# Patient Record
Sex: MALE | URBAN - METROPOLITAN AREA
[De-identification: names, ages, dates, MRNs, and addresses within clinical notes are randomized per-mention and may not be internally consistent; named-entity substitution may affect disease eponyms.]

---

## 2019-01-10 ENCOUNTER — APPOINTMENT (RX ONLY)
Dept: URBAN - METROPOLITAN AREA CLINIC 22 | Facility: CLINIC | Age: 59
Setting detail: DERMATOLOGY
End: 2019-01-10

## 2019-01-10 DIAGNOSIS — L57.0 ACTINIC KERATOSIS: ICD-10-CM

## 2019-01-10 DIAGNOSIS — L82.1 OTHER SEBORRHEIC KERATOSIS: ICD-10-CM

## 2019-01-10 DIAGNOSIS — Z71.89 OTHER SPECIFIED COUNSELING: ICD-10-CM

## 2019-01-10 DIAGNOSIS — D22 MELANOCYTIC NEVI: ICD-10-CM

## 2019-01-10 DIAGNOSIS — D18.0 HEMANGIOMA: ICD-10-CM

## 2019-01-10 DIAGNOSIS — L81.4 OTHER MELANIN HYPERPIGMENTATION: ICD-10-CM

## 2019-01-10 PROBLEM — D48.5 NEOPLASM OF UNCERTAIN BEHAVIOR OF SKIN: Status: ACTIVE | Noted: 2019-01-10

## 2019-01-10 PROBLEM — D22.5 MELANOCYTIC NEVI OF TRUNK: Status: ACTIVE | Noted: 2019-01-10

## 2019-01-10 PROBLEM — D18.01 HEMANGIOMA OF SKIN AND SUBCUTANEOUS TISSUE: Status: ACTIVE | Noted: 2019-01-10

## 2019-01-10 PROCEDURE — 17003 DESTRUCT PREMALG LES 2-14: CPT

## 2019-01-10 PROCEDURE — ? LIQUID NITROGEN

## 2019-01-10 PROCEDURE — 99203 OFFICE O/P NEW LOW 30 MIN: CPT | Mod: 25

## 2019-01-10 PROCEDURE — 11102 TANGNTL BX SKIN SINGLE LES: CPT

## 2019-01-10 PROCEDURE — ? COUNSELING

## 2019-01-10 PROCEDURE — 17000 DESTRUCT PREMALG LESION: CPT | Mod: 59

## 2019-01-10 PROCEDURE — ? BIOPSY BY SHAVE METHOD

## 2019-01-10 ASSESSMENT — LOCATION ZONE DERM
LOCATION ZONE: NOSE
LOCATION ZONE: ARM
LOCATION ZONE: TRUNK
LOCATION ZONE: FACE

## 2019-01-10 ASSESSMENT — LOCATION SIMPLE DESCRIPTION DERM
LOCATION SIMPLE: LEFT FOREARM
LOCATION SIMPLE: RIGHT FOREARM
LOCATION SIMPLE: RIGHT CHEEK
LOCATION SIMPLE: UPPER BACK
LOCATION SIMPLE: INFERIOR FOREHEAD
LOCATION SIMPLE: NOSE
LOCATION SIMPLE: CHEST

## 2019-01-10 ASSESSMENT — LOCATION DETAILED DESCRIPTION DERM
LOCATION DETAILED: INFERIOR MID FOREHEAD
LOCATION DETAILED: LEFT VENTRAL PROXIMAL FOREARM
LOCATION DETAILED: NASAL DORSUM
LOCATION DETAILED: RIGHT MEDIAL MALAR CHEEK
LOCATION DETAILED: SUPERIOR THORACIC SPINE
LOCATION DETAILED: INFERIOR THORACIC SPINE
LOCATION DETAILED: RIGHT VENTRAL PROXIMAL FOREARM
LOCATION DETAILED: LOWER STERNUM

## 2019-01-10 NOTE — HPI: SKIN LESION
Is This A New Presentation, Or A Follow-Up?: Skin Lesion
What Type Of Note Output Would You Prefer (Optional)?: Standard Output
How Severe Is Your Skin Lesion?: mild
Has Your Skin Lesion Been Treated?: not been treated
How Severe Is Your Skin Lesion?: moderate
Has Your Skin Lesion Been Treated?: been treated
Additional History: Patient was given prednisone on 12/13/18 by pcp.
When Was It Treated?: 12/13/2018

## 2019-01-10 NOTE — PROCEDURE: BIOPSY BY SHAVE METHOD
Type Of Destruction Used: Curettage
Electrodesiccation And Curettage Text: The wound bed was treated with electrodesiccation and curettage after the biopsy was performed.
Biopsy Type: H and E
X Size Of Lesion In Cm: 0
Bill [id number] For Specimen Handling/Conveyance To Laboratory?: no
Billing Type: Third-Party Bill
Was A Bandage Applied: Yes
Silver Nitrate Text: The wound bed was treated with silver nitrate after the biopsy was performed.
Hemostasis: Drysol
Notification Instructions: Patient will be notified of biopsy results. However, patient instructed to call the office if not contacted within 2 weeks.
Post-Care Instructions: I reviewed with the patient in detail post-care instructions. Patient is to keep the biopsy site dry overnight. Gentle cleansing daily.  Apply petroleum ointment daily until healed. Patient may apply hydrogen peroxide soaks to remove any crusting.
Anesthesia Volume In Cc: 2
Dressing: pressure dressing with telfa
Consent: Written consent was obtained and risks were reviewed including but not limited to scarring, infection, bleeding, scabbing, incomplete removal, nerve damage and allergy to anesthesia.
Biopsy Method: Personna blade
Lab: [id number]
Detail Level: Detailed
Curettage Text: The wound bed was treated with curettage after the biopsy was performed.
Wound Care: Petrolatum
Electrodesiccation Text: The wound bed was treated with electrodesiccation after the biopsy was performed.
Depth Of Biopsy: dermis
Lab Facility: 
Size Of Lesion In Cm: 2.2
Cryotherapy Text: The wound bed was treated with cryotherapy after the biopsy was performed.
Anesthesia Type: 1% lidocaine with 1:100,000 epinephrine and a 1:10 solution of 8.4% sodium bicarbonate

## 2019-01-23 ENCOUNTER — APPOINTMENT (RX ONLY)
Dept: URBAN - METROPOLITAN AREA CLINIC 22 | Facility: CLINIC | Age: 59
Setting detail: DERMATOLOGY
End: 2019-01-23

## 2019-01-23 PROBLEM — C44.612 BASAL CELL CARCINOMA OF SKIN OF RIGHT UPPER LIMB, INCLUDING SHOULDER: Status: ACTIVE | Noted: 2019-01-23

## 2019-01-23 PROCEDURE — ? EXCISION

## 2019-01-23 PROCEDURE — 11603 EXC TR-EXT MAL+MARG 2.1-3 CM: CPT

## 2019-01-23 PROCEDURE — 12032 INTMD RPR S/A/T/EXT 2.6-7.5: CPT

## 2019-01-23 NOTE — PROCEDURE: EXCISION
Intermediate / Complex Repair - Final Wound Length In Cm: 6.2
Complex Repair And V-Y Plasty Text: The defect edges were debeveled with a #15 scalpel blade.  The primary defect was closed partially with a complex linear closure.  Given the location of the remaining defect, shape of the defect and the proximity to free margins a V-Y plasty was deemed most appropriate for complete closure of the defect.  Using a sterile surgical marker, an appropriate advancement flap was drawn incorporating the defect and placing the expected incisions within the relaxed skin tension lines where possible.    The area thus outlined was incised deep to adipose tissue with a #15 scalpel blade.  The skin margins were undermined to an appropriate distance in all directions utilizing iris scissors.
Purse String (Intermediate) Text: Given the location of the defect and the characteristics of the surrounding skin a purse string intermediate closure was deemed most appropriate.  Undermining was performed circumfirentially around the surgical defect.  A purse string suture was then placed and tightened.
Complex Repair And O-T Advancement Flap Text: The defect edges were debeveled with a #15 scalpel blade.  The primary defect was closed partially with a complex linear closure.  Given the location of the remaining defect, shape of the defect and the proximity to free margins an O-T advancement flap was deemed most appropriate for complete closure of the defect.  Using a sterile surgical marker, an appropriate advancement flap was drawn incorporating the defect and placing the expected incisions within the relaxed skin tension lines where possible.    The area thus outlined was incised deep to adipose tissue with a #15 scalpel blade.  The skin margins were undermined to an appropriate distance in all directions utilizing iris scissors.
Render Path Notes In Note?: no
Positioning (Leave Blank If You Do Not Want): The patient was placed in a comfortable position exposing the surgical site.
Epidermal Sutures: 3-0 Prolene
O-L Flap Text: The defect edges were debeveled with a #15 scalpel blade.  Given the location of the defect, shape of the defect and the proximity to free margins an O-L flap was deemed most appropriate.  Using a sterile surgical marker, an appropriate advancement flap was drawn incorporating the defect and placing the expected incisions within the relaxed skin tension lines where possible.    The area thus outlined was incised deep to adipose tissue with a #15 scalpel blade.  The skin margins were undermined to an appropriate distance in all directions utilizing iris scissors.
Spiral Flap Text: The defect edges were debeveled with a #15 scalpel blade.  Given the location of the defect, shape of the defect and the proximity to free margins a spiral flap was deemed most appropriate.  Using a sterile surgical marker, an appropriate rotation flap was drawn incorporating the defect and placing the expected incisions within the relaxed skin tension lines where possible. The area thus outlined was incised deep to adipose tissue with a #15 scalpel blade.  The skin margins were undermined to an appropriate distance in all directions utilizing iris scissors.
Burow's Advancement Flap Text: The defect edges were debeveled with a #15 scalpel blade.  Given the location of the defect and the proximity to free margins a Burow's advancement flap was deemed most appropriate.  Using a sterile surgical marker, the appropriate advancement flap was drawn incorporating the defect and placing the expected incisions within the relaxed skin tension lines where possible.    The area thus outlined was incised deep to adipose tissue with a #15 scalpel blade.  The skin margins were undermined to an appropriate distance in all directions utilizing iris scissors.
Hatchet Flap Text: The defect edges were debeveled with a #15 scalpel blade.  Given the location of the defect, shape of the defect and the proximity to free margins a hatchet flap was deemed most appropriate.  Using a sterile surgical marker, an appropriate hatchet flap was drawn incorporating the defect and placing the expected incisions within the relaxed skin tension lines where possible.    The area thus outlined was incised deep to adipose tissue with a #15 scalpel blade.  The skin margins were undermined to an appropriate distance in all directions utilizing iris scissors.
Interpolation Flap Text: A decision was made to reconstruct the defect utilizing an interpolation axial flap and a staged reconstruction.  A telfa template was made of the defect.  This telfa template was then used to outline the interpolation flap.  The donor area for the pedicle flap was then injected with anesthesia.  The flap was excised through the skin and subcutaneous tissue down to the layer of the underlying musculature.  The interpolation flap was carefully excised within this deep plane to maintain its blood supply.  The edges of the donor site were undermined.   The donor site was closed in a primary fashion.  The pedicle was then rotated into position and sutured.  Once the tube was sutured into place, adequate blood supply was confirmed with blanching and refill.  The pedicle was then wrapped with xeroform gauze and dressed appropriately with a telfa and gauze bandage to ensure continued blood supply and protect the attached pedicle.
Advancement Flap (Single) Text: The defect edges were debeveled with a #15 scalpel blade.  Given the location of the defect and the proximity to free margins a single advancement flap was deemed most appropriate.  Using a sterile surgical marker, an appropriate advancement flap was drawn incorporating the defect and placing the expected incisions within the relaxed skin tension lines where possible.    The area thus outlined was incised deep to adipose tissue with a #15 scalpel blade.  The skin margins were undermined to an appropriate distance in all directions utilizing iris scissors.
O-T Plasty Text: The defect edges were debeveled with a #15 scalpel blade.  Given the location of the defect, shape of the defect and the proximity to free margins an O-T plasty was deemed most appropriate.  Using a sterile surgical marker, an appropriate O-T plasty was drawn incorporating the defect and placing the expected incisions within the relaxed skin tension lines where possible.    The area thus outlined was incised deep to adipose tissue with a #15 scalpel blade.  The skin margins were undermined to an appropriate distance in all directions utilizing iris scissors.
O-Z Plasty Text: The defect edges were debeveled with a #15 scalpel blade.  Given the location of the defect, shape of the defect and the proximity to free margins an O-Z plasty (double transposition flap) was deemed most appropriate.  Using a sterile surgical marker, the appropriate transposition flaps were drawn incorporating the defect and placing the expected incisions within the relaxed skin tension lines where possible.    The area thus outlined was incised deep to adipose tissue with a #15 scalpel blade.  The skin margins were undermined to an appropriate distance in all directions utilizing iris scissors.  Hemostasis was achieved with electrocautery.  The flaps were then transposed into place, one clockwise and the other counterclockwise, and anchored with interrupted buried subcutaneous sutures.
Primary Defect Width (In Cm): 0
V-Y Flap Text: The defect edges were debeveled with a #15 scalpel blade.  Given the location of the defect, shape of the defect and the proximity to free margins a V-Y flap was deemed most appropriate.  Using a sterile surgical marker, an appropriate advancement flap was drawn incorporating the defect and placing the expected incisions within the relaxed skin tension lines where possible.    The area thus outlined was incised deep to adipose tissue with a #15 scalpel blade.  The skin margins were undermined to an appropriate distance in all directions utilizing iris scissors.
Alar Island Pedicle Flap Text: The defect edges were debeveled with a #15 scalpel blade.  Given the location of the defect, shape of the defect and the proximity to the alar rim an island pedicle advancement flap was deemed most appropriate.  Using a sterile surgical marker, an appropriate advancement flap was drawn incorporating the defect, outlining the appropriate donor tissue and placing the expected incisions within the nasal ala running parallel to the alar rim. The area thus outlined was incised with a #15 scalpel blade.  The skin margins were undermined minimally to an appropriate distance in all directions around the primary defect and laterally outward around the island pedicle utilizing iris scissors.  There was minimal undermining beneath the pedicle flap.
Bilobed Flap Text: The defect edges were debeveled with a #15 scalpel blade.  Given the location of the defect and the proximity to free margins a bilobe flap was deemed most appropriate.  Using a sterile surgical marker, an appropriate bilobe flap drawn around the defect.    The area thus outlined was incised deep to adipose tissue with a #15 scalpel blade.  The skin margins were undermined to an appropriate distance in all directions utilizing iris scissors.
Curvilinear Excision Additional Text (Leave Blank If You Do Not Want): The margin was drawn around the clinically apparent lesion.  A curvilinear shape was then drawn on the skin incorporating the lesion and margins.  Incisions were then made along these lines to the appropriate tissue plane and the lesion was extirpated.
Cartilage Graft Text: The defect edges were debeveled with a #15 scalpel blade.  Given the location of the defect, shape of the defect, the fact the defect involved a full thickness cartilage defect a cartilage graft was deemed most appropriate.  An appropriate donor site was identified, cleansed, and anesthetized. The cartilage graft was then harvested and transferred to the recipient site, oriented appropriately and then sutured into place.  The secondary defect was then repaired using a primary closure.
Complex Repair And W Plasty Text: The defect edges were debeveled with a #15 scalpel blade.  The primary defect was closed partially with a complex linear closure.  Given the location of the remaining defect, shape of the defect and the proximity to free margins a W plasty was deemed most appropriate for complete closure of the defect.  Using a sterile surgical marker, an appropriate advancement flap was drawn incorporating the defect and placing the expected incisions within the relaxed skin tension lines where possible.    The area thus outlined was incised deep to adipose tissue with a #15 scalpel blade.  The skin margins were undermined to an appropriate distance in all directions utilizing iris scissors.
Transposition Flap Text: The defect edges were debeveled with a #15 scalpel blade.  Given the location of the defect and the proximity to free margins a transposition flap was deemed most appropriate.  Using a sterile surgical marker, an appropriate transposition flap was drawn incorporating the defect.    The area thus outlined was incised deep to adipose tissue with a #15 scalpel blade.  The skin margins were undermined to an appropriate distance in all directions utilizing iris scissors.
Purse String (Simple) Text: Given the location of the defect and the characteristics of the surrounding skin a purse string simple closure was deemed most appropriate.  Undermining was performed circumferentially around the surgical defect.  A purse string suture was then placed and tightened.
Complex Repair And Melolabial Flap Text: The defect edges were debeveled with a #15 scalpel blade.  The primary defect was closed partially with a complex linear closure.  Given the location of the remaining defect, shape of the defect and the proximity to free margins a melolabial flap was deemed most appropriate for complete closure of the defect.  Using a sterile surgical marker, an appropriate advancement flap was drawn incorporating the defect and placing the expected incisions within the relaxed skin tension lines where possible.    The area thus outlined was incised deep to adipose tissue with a #15 scalpel blade.  The skin margins were undermined to an appropriate distance in all directions utilizing iris scissors.
Graft Donor Site Bandage (Optional-Leave Blank If You Don't Want In Note): Steri-strips and a pressure bandage were applied to the donor site.
Dressing: pressure dressing with telfa
A-T Advancement Flap Text: The defect edges were debeveled with a #15 scalpel blade.  Given the location of the defect, shape of the defect and the proximity to free margins an A-T advancement flap was deemed most appropriate.  Using a sterile surgical marker, an appropriate advancement flap was drawn incorporating the defect and placing the expected incisions within the relaxed skin tension lines where possible.    The area thus outlined was incised deep to adipose tissue with a #15 scalpel blade.  The skin margins were undermined to an appropriate distance in all directions utilizing iris scissors.
Complex Repair And Single Advancement Flap Text: The defect edges were debeveled with a #15 scalpel blade.  The primary defect was closed partially with a complex linear closure.  Given the location of the remaining defect, shape of the defect and the proximity to free margins a single advancement flap was deemed most appropriate for complete closure of the defect.  Using a sterile surgical marker, an appropriate advancement flap was drawn incorporating the defect and placing the expected incisions within the relaxed skin tension lines where possible.    The area thus outlined was incised deep to adipose tissue with a #15 scalpel blade.  The skin margins were undermined to an appropriate distance in all directions utilizing iris scissors.
Complex Repair And Z Plasty Text: The defect edges were debeveled with a #15 scalpel blade.  The primary defect was closed partially with a complex linear closure.  Given the location of the remaining defect, shape of the defect and the proximity to free margins a Z plasty was deemed most appropriate for complete closure of the defect.  Using a sterile surgical marker, an appropriate advancement flap was drawn incorporating the defect and placing the expected incisions within the relaxed skin tension lines where possible.    The area thus outlined was incised deep to adipose tissue with a #15 scalpel blade.  The skin margins were undermined to an appropriate distance in all directions utilizing iris scissors.
No Repair - Repaired With Adjacent Surgical Defect Text (Leave Blank If You Do Not Want): After the excision the defect was repaired concurrently with another surgical defect which was in close approximation.
Island Pedicle Flap-Requiring Vessel Identification Text: The defect edges were debeveled with a #15 scalpel blade.  Given the location of the defect, shape of the defect and the proximity to free margins an island pedicle advancement flap was deemed most appropriate.  Using a sterile surgical marker, an appropriate advancement flap was drawn, based on the axial vessel mentioned above, incorporating the defect, outlining the appropriate donor tissue and placing the expected incisions within the relaxed skin tension lines where possible.    The area thus outlined was incised deep to adipose tissue with a #15 scalpel blade.  The skin margins were undermined to an appropriate distance in all directions around the primary defect and laterally outward around the island pedicle utilizing iris scissors.  There was minimal undermining beneath the pedicle flap.
Complex Repair And Split-Thickness Skin Graft Text: The defect edges were debeveled with a #15 scalpel blade.  The primary defect was closed partially with a complex linear closure.  Given the location of the defect, shape of the defect and the proximity to free margins a split thickness skin graft was deemed most appropriate to repair the remaining defect.  The graft was trimmed to fit the size of the remaining defect.  The graft was then placed in the primary defect, oriented appropriately, and sutured into place.
Show Curettage Variables: Yes
Epidermal Autograft Text: The defect edges were debeveled with a #15 scalpel blade.  Given the location of the defect, shape of the defect and the proximity to free margins an epidermal autograft was deemed most appropriate.  Using a sterile surgical marker, the primary defect shape was transferred to the donor site. The epidermal graft was then harvested.  The skin graft was then placed in the primary defect and oriented appropriately.
Complex Repair And Dermal Autograft Text: The defect edges were debeveled with a #15 scalpel blade.  The primary defect was closed partially with a complex linear closure.  Given the location of the defect, shape of the defect and the proximity to free margins an dermal autograft was deemed most appropriate to repair the remaining defect.  The graft was trimmed to fit the size of the remaining defect.  The graft was then placed in the primary defect, oriented appropriately, and sutured into place.
Trilobed Flap Text: The defect edges were debeveled with a #15 scalpel blade.  Given the location of the defect and the proximity to free margins a trilobed flap was deemed most appropriate.  Using a sterile surgical marker, an appropriate trilobed flap drawn around the defect.    The area thus outlined was incised deep to adipose tissue with a #15 scalpel blade.  The skin margins were undermined to an appropriate distance in all directions utilizing iris scissors.
V-Y Plasty Text: The defect edges were debeveled with a #15 scalpel blade.  Given the location of the defect, shape of the defect and the proximity to free margins an V-Y advancement flap was deemed most appropriate.  Using a sterile surgical marker, an appropriate advancement flap was drawn incorporating the defect and placing the expected incisions within the relaxed skin tension lines where possible.    The area thus outlined was incised deep to adipose tissue with a #15 scalpel blade.  The skin margins were undermined to an appropriate distance in all directions utilizing iris scissors.
Repair Performed By Another Provider Text (Leave Blank If You Do Not Want): After the tissue was excised the defect was repaired by another provider.
Advancement-Rotation Flap Text: The defect edges were debeveled with a #15 scalpel blade.  Given the location of the defect, shape of the defect and the proximity to free margins an advancement-rotation flap was deemed most appropriate.  Using a sterile surgical marker, an appropriate flap was drawn incorporating the defect and placing the expected incisions within the relaxed skin tension lines where possible. The area thus outlined was incised deep to adipose tissue with a #15 scalpel blade.  The skin margins were undermined to an appropriate distance in all directions utilizing iris scissors.
Complex Repair And O-L Flap Text: The defect edges were debeveled with a #15 scalpel blade.  The primary defect was closed partially with a complex linear closure.  Given the location of the remaining defect, shape of the defect and the proximity to free margins an O-L flap was deemed most appropriate for complete closure of the defect.  Using a sterile surgical marker, an appropriate flap was drawn incorporating the defect and placing the expected incisions within the relaxed skin tension lines where possible.    The area thus outlined was incised deep to adipose tissue with a #15 scalpel blade.  The skin margins were undermined to an appropriate distance in all directions utilizing iris scissors.
Intermediate Repair Preamble Text (Leave Blank If You Do Not Want): Undermining was performed with blunt dissection.
M-Plasty Complex Repair Preamble Text (Leave Blank If You Do Not Want): Extensive wide undermining was performed.
Dermal Closure: simple interrupted
Ftsg Text: The defect edges were debeveled with a #15 scalpel blade.  Given the location of the defect, shape of the defect and the proximity to free margins a full thickness skin graft was deemed most appropriate.  Using a sterile surgical marker, the primary defect shape was transferred to the donor site. The area thus outlined was incised deep to adipose tissue with a #15 scalpel blade.  The harvested graft was then trimmed of adipose tissue until only dermis and epidermis was left.  The skin margins of the secondary defect were undermined to an appropriate distance in all directions utilizing iris scissors.  The secondary defect was closed with interrupted buried subcutaneous sutures.  The skin edges were then re-apposed with running  sutures.  The skin graft was then placed in the primary defect and oriented appropriately.
Elliptical Excision Additional Text (Leave Blank If You Do Not Want): The margin was drawn around the clinically apparent lesion.  An elliptical shape was then drawn on the skin incorporating the lesion and margins.  Incisions were then made along these lines to the appropriate tissue plane and the lesion was extirpated.
Excision Depth: adipose tissue
Fusiform Excision Additional Text (Leave Blank If You Do Not Want): The margin was drawn around the clinically apparent lesion.  A fusiform shape was then drawn on the skin incorporating the lesion and margins.  Incisions were then made along these lines to the appropriate tissue plane and the lesion was extirpated.
Rhombic Flap Text: The defect edges were debeveled with a #15 scalpel blade.  Given the location of the defect and the proximity to free margins a rhombic flap was deemed most appropriate.  Using a sterile surgical marker, an appropriate rhombic flap was drawn incorporating the defect.    The area thus outlined was incised deep to adipose tissue with a #15 scalpel blade.  The skin margins were undermined to an appropriate distance in all directions utilizing iris scissors.
Estimated Blood Loss (Cc): minimal
Wound Care: Petrolatum
Composite Graft Text: The defect edges were debeveled with a #15 scalpel blade.  Given the location of the defect, shape of the defect, the proximity to free margins and the fact the defect was full thickness a composite graft was deemed most appropriate.  The defect was outline and then transferred to the donor site.  A full thickness graft was then excised from the donor site. The graft was then placed in the primary defect, oriented appropriately and then sutured into place.  The secondary defect was then repaired using a primary closure.
Complex Repair And Double M Plasty Text: The defect edges were debeveled with a #15 scalpel blade.  The primary defect was closed partially with a complex linear closure.  Given the location of the remaining defect, shape of the defect and the proximity to free margins a double M plasty was deemed most appropriate for complete closure of the defect.  Using a sterile surgical marker, an appropriate advancement flap was drawn incorporating the defect and placing the expected incisions within the relaxed skin tension lines where possible.    The area thus outlined was incised deep to adipose tissue with a #15 scalpel blade.  The skin margins were undermined to an appropriate distance in all directions utilizing iris scissors.
Complex Repair And Bilobe Flap Text: The defect edges were debeveled with a #15 scalpel blade.  The primary defect was closed partially with a complex linear closure.  Given the location of the remaining defect, shape of the defect and the proximity to free margins a bilobe flap was deemed most appropriate for complete closure of the defect.  Using a sterile surgical marker, an appropriate advancement flap was drawn incorporating the defect and placing the expected incisions within the relaxed skin tension lines where possible.    The area thus outlined was incised deep to adipose tissue with a #15 scalpel blade.  The skin margins were undermined to an appropriate distance in all directions utilizing iris scissors.
Muscle Hinge Flap Text: The defect edges were debeveled with a #15 scalpel blade.  Given the size, depth and location of the defect and the proximity to free margins a muscle hinge flap was deemed most appropriate.  Using a sterile surgical marker, an appropriate hinge flap was drawn incorporating the defect. The area thus outlined was incised with a #15 scalpel blade.  The skin margins were undermined to an appropriate distance in all directions utilizing iris scissors.
Complex Repair And Epidermal Autograft Text: The defect edges were debeveled with a #15 scalpel blade.  The primary defect was closed partially with a complex linear closure.  Given the location of the defect, shape of the defect and the proximity to free margins an epidermal autograft was deemed most appropriate to repair the remaining defect.  The graft was trimmed to fit the size of the remaining defect.  The graft was then placed in the primary defect, oriented appropriately, and sutured into place.
Consent was obtained from the patient. The risks and benefits to therapy were discussed in detail. Specifically, the risks of infection, scarring, bleeding, prolonged wound healing, incomplete removal, allergy to anesthesia, nerve injury and recurrence were addressed. Prior to the procedure, the treatment site was clearly identified and confirmed by the patient. All components of Universal Protocol/PAUSE Rule completed.
Excision Method: Elliptical
O-T Advancement Flap Text: The defect edges were debeveled with a #15 scalpel blade.  Given the location of the defect, shape of the defect and the proximity to free margins an O-T advancement flap was deemed most appropriate.  Using a sterile surgical marker, an appropriate advancement flap was drawn incorporating the defect and placing the expected incisions within the relaxed skin tension lines where possible.    The area thus outlined was incised deep to adipose tissue with a #15 scalpel blade.  The skin margins were undermined to an appropriate distance in all directions utilizing iris scissors.
Keystone Flap Text: The defect edges were debeveled with a #15 scalpel blade.  Given the location of the defect, shape of the defect a keystone flap was deemed most appropriate.  Using a sterile surgical marker, an appropriate keystone flap was drawn incorporating the defect, outlining the appropriate donor tissue and placing the expected incisions within the relaxed skin tension lines where possible. The area thus outlined was incised deep to adipose tissue with a #15 scalpel blade.  The skin margins were undermined to an appropriate distance in all directions around the primary defect and laterally outward around the flap utilizing iris scissors.
Paramedian Forehead Flap Text: A decision was made to reconstruct the defect utilizing an interpolation axial flap and a staged reconstruction.  A telfa template was made of the defect.  This telfa template was then used to outline the paramedian forehead pedicle flap.  The donor area for the pedicle flap was then injected with anesthesia.  The flap was excised through the skin and subcutaneous tissue down to the layer of the underlying musculature.  The pedicle flap was carefully excised within this deep plane to maintain its blood supply.  The edges of the donor site were undermined.   The donor site was closed in a primary fashion.  The pedicle was then rotated into position and sutured.  Once the tube was sutured into place, adequate blood supply was confirmed with blanching and refill.  The pedicle was then wrapped with xeroform gauze and dressed appropriately with a telfa and gauze bandage to ensure continued blood supply and protect the attached pedicle.
Suture Removal: 14 days
Z Plasty Text: The lesion was extirpated to the level of the fat with a #15 scalpel blade.  Given the location of the defect, shape of the defect and the proximity to free margins a Z-plasty was deemed most appropriate for repair.  Using a sterile surgical marker, the appropriate transposition arms of the Z-plasty were drawn incorporating the defect and placing the expected incisions within the relaxed skin tension lines where possible.    The area thus outlined was incised deep to adipose tissue with a #15 scalpel blade.  The skin margins were undermined to an appropriate distance in all directions utilizing iris scissors.  The opposing transposition arms were then transposed into place in opposite direction and anchored with interrupted buried subcutaneous sutures.
Banner Transposition Flap Text: The defect edges were debeveled with a #15 scalpel blade.  Given the location of the defect and the proximity to free margins a Banner transposition flap was deemed most appropriate.  Using a sterile surgical marker, an appropriate flap drawn around the defect. The area thus outlined was incised deep to adipose tissue with a #15 scalpel blade.  The skin margins were undermined to an appropriate distance in all directions utilizing iris scissors.
Cheek-To-Nose Interpolation Flap Text: A decision was made to reconstruct the defect utilizing an interpolation axial flap and a staged reconstruction.  A telfa template was made of the defect.  This telfa template was then used to outline the Cheek-To-Nose Interpolation flap.  The donor area for the pedicle flap was then injected with anesthesia.  The flap was excised through the skin and subcutaneous tissue down to the layer of the underlying musculature.  The interpolation flap was carefully excised within this deep plane to maintain its blood supply.  The edges of the donor site were undermined.   The donor site was closed in a primary fashion.  The pedicle was then rotated into position and sutured.  Once the tube was sutured into place, adequate blood supply was confirmed with blanching and refill.  The pedicle was then wrapped with xeroform gauze and dressed appropriately with a telfa and gauze bandage to ensure continued blood supply and protect the attached pedicle.
Mucosal Advancement Flap Text: Given the location of the defect, shape of the defect and the proximity to free margins a mucosal advancement flap was deemed most appropriate. Incisions were made with a 15 blade scalpel in the appropriate fashion along the cutaneous vermilion border and the mucosal lip. The remaining actinically damaged mucosal tissue was excised.  The mucosal advancement flap was then elevated to the gingival sulcus with care taken to preserve the neurovascular structures and advanced into the primary defect. Care was taken to ensure that precise realignment of the vermilion border was achieved.
Complex Repair And Modified Advancement Flap Text: The defect edges were debeveled with a #15 scalpel blade.  The primary defect was closed partially with a complex linear closure.  Given the location of the remaining defect, shape of the defect and the proximity to free margins a modified advancement flap was deemed most appropriate for complete closure of the defect.  Using a sterile surgical marker, an appropriate advancement flap was drawn incorporating the defect and placing the expected incisions within the relaxed skin tension lines where possible.    The area thus outlined was incised deep to adipose tissue with a #15 scalpel blade.  The skin margins were undermined to an appropriate distance in all directions utilizing iris scissors.
Complex Repair And Rotation Flap Text: The defect edges were debeveled with a #15 scalpel blade.  The primary defect was closed partially with a complex linear closure.  Given the location of the remaining defect, shape of the defect and the proximity to free margins a rotation flap was deemed most appropriate for complete closure of the defect.  Using a sterile surgical marker, an appropriate advancement flap was drawn incorporating the defect and placing the expected incisions within the relaxed skin tension lines where possible.    The area thus outlined was incised deep to adipose tissue with a #15 scalpel blade.  The skin margins were undermined to an appropriate distance in all directions utilizing iris scissors.
Helical Rim Advancement Flap Text: The defect edges were debeveled with a #15 blade scalpel.  Given the location of the defect and the proximity to free margins (helical rim) a double helical rim advancement flap was deemed most appropriate.  Using a sterile surgical marker, the appropriate advancement flaps were drawn incorporating the defect and placing the expected incisions between the helical rim and antihelix where possible.  The area thus outlined was incised through and through with a #15 scalpel blade.  With a skin hook and iris scissors, the flaps were gently and sharply undermined and freed up.
Complex Repair And Rhombic Flap Text: The defect edges were debeveled with a #15 scalpel blade.  The primary defect was closed partially with a complex linear closure.  Given the location of the remaining defect, shape of the defect and the proximity to free margins a rhombic flap was deemed most appropriate for complete closure of the defect.  Using a sterile surgical marker, an appropriate advancement flap was drawn incorporating the defect and placing the expected incisions within the relaxed skin tension lines where possible.    The area thus outlined was incised deep to adipose tissue with a #15 scalpel blade.  The skin margins were undermined to an appropriate distance in all directions utilizing iris scissors.
Star Wedge Flap Text: The defect edges were debeveled with a #15 scalpel blade.  Given the location of the defect, shape of the defect and the proximity to free margins a star wedge flap was deemed most appropriate.  Using a sterile surgical marker, an appropriate rotation flap was drawn incorporating the defect and placing the expected incisions within the relaxed skin tension lines where possible. The area thus outlined was incised deep to adipose tissue with a #15 scalpel blade.  The skin margins were undermined to an appropriate distance in all directions utilizing iris scissors.
H Plasty Text: Given the location of the defect, shape of the defect and the proximity to free margins a H-plasty was deemed most appropriate for repair.  Using a sterile surgical marker, the appropriate advancement arms of the H-plasty were drawn incorporating the defect and placing the expected incisions within the relaxed skin tension lines where possible. The area thus outlined was incised deep to adipose tissue with a #15 scalpel blade. The skin margins were undermined to an appropriate distance in all directions utilizing iris scissors.  The opposing advancement arms were then advanced into place in opposite direction and anchored with interrupted buried subcutaneous sutures.
Posterior Auricular Interpolation Flap Text: A decision was made to reconstruct the defect utilizing an interpolation axial flap and a staged reconstruction.  A telfa template was made of the defect.  This telfa template was then used to outline the posterior auricular interpolation flap.  The donor area for the pedicle flap was then injected with anesthesia.  The flap was excised through the skin and subcutaneous tissue down to the layer of the underlying musculature.  The pedicle flap was carefully excised within this deep plane to maintain its blood supply.  The edges of the donor site were undermined.   The donor site was closed in a primary fashion.  The pedicle was then rotated into position and sutured.  Once the tube was sutured into place, adequate blood supply was confirmed with blanching and refill.  The pedicle was then wrapped with xeroform gauze and dressed appropriately with a telfa and gauze bandage to ensure continued blood supply and protect the attached pedicle.
W Plasty Text: The lesion was extirpated to the level of the fat with a #15 scalpel blade.  Given the location of the defect, shape of the defect and the proximity to free margins a W-plasty was deemed most appropriate for repair.  Using a sterile surgical marker, the appropriate transposition arms of the W-plasty were drawn incorporating the defect and placing the expected incisions within the relaxed skin tension lines where possible.    The area thus outlined was incised deep to adipose tissue with a #15 scalpel blade.  The skin margins were undermined to an appropriate distance in all directions utilizing iris scissors.  The opposing transposition arms were then transposed into place in opposite direction and anchored with interrupted buried subcutaneous sutures.
Melolabial Transposition Flap Text: The defect edges were debeveled with a #15 scalpel blade.  Given the location of the defect and the proximity to free margins a melolabial flap was deemed most appropriate.  Using a sterile surgical marker, an appropriate melolabial transposition flap was drawn incorporating the defect.    The area thus outlined was incised deep to adipose tissue with a #15 scalpel blade.  The skin margins were undermined to an appropriate distance in all directions utilizing iris scissors.
Repair Type: Intermediate
Partial Purse String (Intermediate) Text: Given the location of the defect and the characteristics of the surrounding skin an intermediate purse string closure was deemed most appropriate.  Undermining was performed circumferentially around the surgical defect.  A purse string suture was then placed and tightened. Wound tension of the circular defect prevented complete closure of the wound.
Hemostasis: Electrocautery
Tissue Cultured Epidermal Autograft Text: The defect edges were debeveled with a #15 scalpel blade.  Given the location of the defect, shape of the defect and the proximity to free margins a tissue cultured epidermal autograft was deemed most appropriate.  The graft was then trimmed to fit the size of the defect.  The graft was then placed in the primary defect and oriented appropriately.
Complex Repair And M Plasty Text: The defect edges were debeveled with a #15 scalpel blade.  The primary defect was closed partially with a complex linear closure.  Given the location of the remaining defect, shape of the defect and the proximity to free margins an M plasty was deemed most appropriate for complete closure of the defect.  Using a sterile surgical marker, an appropriate advancement flap was drawn incorporating the defect and placing the expected incisions within the relaxed skin tension lines where possible.    The area thus outlined was incised deep to adipose tissue with a #15 scalpel blade.  The skin margins were undermined to an appropriate distance in all directions utilizing iris scissors.
Anesthesia Volume In Cc: 7
Scalpel Size: 15 blade
Double Island Pedicle Flap Text: The defect edges were debeveled with a #15 scalpel blade.  Given the location of the defect, shape of the defect and the proximity to free margins a double island pedicle advancement flap was deemed most appropriate.  Using a sterile surgical marker, an appropriate advancement flap was drawn incorporating the defect, outlining the appropriate donor tissue and placing the expected incisions within the relaxed skin tension lines where possible.    The area thus outlined was incised deep to adipose tissue with a #15 scalpel blade.  The skin margins were undermined to an appropriate distance in all directions around the primary defect and laterally outward around the island pedicle utilizing iris scissors.  There was minimal undermining beneath the pedicle flap.
Post-Care Instructions: I reviewed with the patient in detail post-care instructions. Patient is not to engage in any heavy lifting, exercise, or swimming for the next 14 days. Should the patient develop any fevers, chills, expanding redness, bleeding, purulent drainage, severe pain patient will contact the office immediately.
Deep Sutures: 3-0 Maxon
Home Suture Removal Text: Patient was provided a home suture removal kit and will remove their sutures at home.  If they have any questions or difficulties they will call the office.
Slit Excision Additional Text (Leave Blank If You Do Not Want): A linear line was drawn on the skin overlying the lesion. An incision was made slowly until the lesion was visualized.  Once visualized, the lesion was removed with blunt dissection.
Anesthesia Type: 1% lidocaine with epinephrine
Complex Repair And Dorsal Nasal Flap Text: The defect edges were debeveled with a #15 scalpel blade.  The primary defect was closed partially with a complex linear closure.  Given the location of the remaining defect, shape of the defect and the proximity to free margins a dorsal nasal flap was deemed most appropriate for complete closure of the defect.  Using a sterile surgical marker, an appropriate flap was drawn incorporating the defect and placing the expected incisions within the relaxed skin tension lines where possible.    The area thus outlined was incised deep to adipose tissue with a #15 scalpel blade.  The skin margins were undermined to an appropriate distance in all directions utilizing iris scissors.
Rotation Flap Text: The defect edges were debeveled with a #15 scalpel blade.  Given the location of the defect, shape of the defect and the proximity to free margins a rotation flap was deemed most appropriate.  Using a sterile surgical marker, an appropriate rotation flap was drawn incorporating the defect and placing the expected incisions within the relaxed skin tension lines where possible.    The area thus outlined was incised deep to adipose tissue with a #15 scalpel blade.  The skin margins were undermined to an appropriate distance in all directions utilizing iris scissors.
Ear Star Wedge Flap Text: The defect edges were debeveled with a #15 blade scalpel.  Given the location of the defect and the proximity to free margins (helical rim) an ear star wedge flap was deemed most appropriate.  Using a sterile surgical marker, the appropriate flap was drawn incorporating the defect and placing the expected incisions between the helical rim and antihelix where possible.  The area thus outlined was incised through and through with a #15 scalpel blade.
Saucerization Excision Additional Text (Leave Blank If You Do Not Want): The margin was drawn around the clinically apparent lesion.  Incisions were then made along these lines, in a tangential fashion, to the appropriate tissue plane and the lesion was extirpated.
Crescentic Advancement Flap Text: The defect edges were debeveled with a #15 scalpel blade.  Given the location of the defect and the proximity to free margins a crescentic advancement flap was deemed most appropriate.  Using a sterile surgical marker, the appropriate advancement flap was drawn incorporating the defect and placing the expected incisions within the relaxed skin tension lines where possible.    The area thus outlined was incised deep to adipose tissue with a #15 scalpel blade.  The skin margins were undermined to an appropriate distance in all directions utilizing iris scissors.
Perilesional Excision Additional Text (Leave Blank If You Do Not Want): The margin was drawn around the clinically apparent lesion. Incisions were then made along these lines to the appropriate tissue plane and the lesion was extirpated.
Modified Advancement Flap Text: The defect edges were debeveled with a #15 scalpel blade.  Given the location of the defect, shape of the defect and the proximity to free margins a modified advancement flap was deemed most appropriate.  Using a sterile surgical marker, an appropriate advancement flap was drawn incorporating the defect and placing the expected incisions within the relaxed skin tension lines where possible.    The area thus outlined was incised deep to adipose tissue with a #15 scalpel blade.  The skin margins were undermined to an appropriate distance in all directions utilizing iris scissors.
Split-Thickness Skin Graft Text: The defect edges were debeveled with a #15 scalpel blade.  Given the location of the defect, shape of the defect and the proximity to free margins a split thickness skin graft was deemed most appropriate.  Using a sterile surgical marker, the primary defect shape was transferred to the donor site. The split thickness graft was then harvested.  The skin graft was then placed in the primary defect and oriented appropriately.
Path Notes (To The Dermatopathologist): Please check margins.
Lab: [id number]
Dorsal Nasal Flap Text: The defect edges were debeveled with a #15 scalpel blade.  Given the location of the defect and the proximity to free margins a dorsal nasal flap was deemed most appropriate.  Using a sterile surgical marker, an appropriate dorsal nasal flap was drawn around the defect.    The area thus outlined was incised deep to adipose tissue with a #15 scalpel blade.  The skin margins were undermined to an appropriate distance in all directions utilizing iris scissors.
Size Of Margin In Cm: 0.3
Island Pedicle Flap Text: The defect edges were debeveled with a #15 scalpel blade.  Given the location of the defect, shape of the defect and the proximity to free margins an island pedicle advancement flap was deemed most appropriate.  Using a sterile surgical marker, an appropriate advancement flap was drawn incorporating the defect, outlining the appropriate donor tissue and placing the expected incisions within the relaxed skin tension lines where possible.    The area thus outlined was incised deep to adipose tissue with a #15 scalpel blade.  The skin margins were undermined to an appropriate distance in all directions around the primary defect and laterally outward around the island pedicle utilizing iris scissors.  There was minimal undermining beneath the pedicle flap.
Bilobed Transposition Flap Text: The defect edges were debeveled with a #15 scalpel blade.  Given the location of the defect and the proximity to free margins a bilobed transposition flap was deemed most appropriate.  Using a sterile surgical marker, an appropriate bilobe flap drawn around the defect.    The area thus outlined was incised deep to adipose tissue with a #15 scalpel blade.  The skin margins were undermined to an appropriate distance in all directions utilizing iris scissors.
Skin Substitute Text: The defect edges were debeveled with a #15 scalpel blade.  Given the location of the defect, shape of the defect and the proximity to free margins a skin substitute graft was deemed most appropriate.  The graft material was trimmed to fit the size of the defect. The graft was then placed in the primary defect and oriented appropriately.
Complex Repair And Ftsg Text: The defect edges were debeveled with a #15 scalpel blade.  The primary defect was closed partially with a complex linear closure.  Given the location of the defect, shape of the defect and the proximity to free margins a full thickness skin graft was deemed most appropriate to repair the remaining defect.  The graft was trimmed to fit the size of the remaining defect.  The graft was then placed in the primary defect, oriented appropriately, and sutured into place.
Size Of Lesion In Cm: 2.2
Bi-Rhombic Flap Text: The defect edges were debeveled with a #15 scalpel blade.  Given the location of the defect and the proximity to free margins a bi-rhombic flap was deemed most appropriate.  Using a sterile surgical marker, an appropriate rhombic flap was drawn incorporating the defect. The area thus outlined was incised deep to adipose tissue with a #15 scalpel blade.  The skin margins were undermined to an appropriate distance in all directions utilizing iris scissors.
Cheek Interpolation Flap Text: A decision was made to reconstruct the defect utilizing an interpolation axial flap and a staged reconstruction.  A telfa template was made of the defect.  This telfa template was then used to outline the Cheek Interpolation flap.  The donor area for the pedicle flap was then injected with anesthesia.  The flap was excised through the skin and subcutaneous tissue down to the layer of the underlying musculature.  The interpolation flap was carefully excised within this deep plane to maintain its blood supply.  The edges of the donor site were undermined.   The donor site was closed in a primary fashion.  The pedicle was then rotated into position and sutured.  Once the tube was sutured into place, adequate blood supply was confirmed with blanching and refill.  The pedicle was then wrapped with xeroform gauze and dressed appropriately with a telfa and gauze bandage to ensure continued blood supply and protect the attached pedicle.
Advancement Flap (Double) Text: The defect edges were debeveled with a #15 scalpel blade.  Given the location of the defect and the proximity to free margins a double advancement flap was deemed most appropriate.  Using a sterile surgical marker, the appropriate advancement flaps were drawn incorporating the defect and placing the expected incisions within the relaxed skin tension lines where possible.    The area thus outlined was incised deep to adipose tissue with a #15 scalpel blade.  The skin margins were undermined to an appropriate distance in all directions utilizing iris scissors.
Lip Wedge Excision Repair Text: Given the location of the defect and the proximity to free margins a full thickness wedge repair was deemed most appropriate.  Using a sterile surgical marker, the appropriate repair was drawn incorporating the defect and placing the expected incisions perpendicular to the vermilion border.  The vermilion border was also meticulously outlined to ensure appropriate reapproximation during the repair.  The area thus outlined was incised through and through with a #15 scalpel blade.  The muscularis and dermis were reaproximated with deep sutures following hemostasis. Care was taken to realign the vermilion border before proceeding with the superficial closure.  Once the vermilion was realigned the superfical and mucosal closure was finished.
Complex Repair And Transposition Flap Text: The defect edges were debeveled with a #15 scalpel blade.  The primary defect was closed partially with a complex linear closure.  Given the location of the remaining defect, shape of the defect and the proximity to free margins a transposition flap was deemed most appropriate for complete closure of the defect.  Using a sterile surgical marker, an appropriate advancement flap was drawn incorporating the defect and placing the expected incisions within the relaxed skin tension lines where possible.    The area thus outlined was incised deep to adipose tissue with a #15 scalpel blade.  The skin margins were undermined to an appropriate distance in all directions utilizing iris scissors.
Bilateral Helical Rim Advancement Flap Text: The defect edges were debeveled with a #15 blade scalpel.  Given the location of the defect and the proximity to free margins (helical rim) a bilateral helical rim advancement flap was deemed most appropriate.  Using a sterile surgical marker, the appropriate advancement flaps were drawn incorporating the defect and placing the expected incisions between the helical rim and antihelix where possible.  The area thus outlined was incised through and through with a #15 scalpel blade.  With a skin hook and iris scissors, the flaps were gently and sharply undermined and freed up.
Dermal Autograft Text: The defect edges were debeveled with a #15 scalpel blade.  Given the location of the defect, shape of the defect and the proximity to free margins a dermal autograft was deemed most appropriate.  Using a sterile surgical marker, the primary defect shape was transferred to the donor site. The area thus outlined was incised deep to adipose tissue with a #15 scalpel blade.  The harvested graft was then trimmed of adipose and epidermal tissue until only dermis was left.  The skin graft was then placed in the primary defect and oriented appropriately.
Pre-Excision Curettage Text (Leave Blank If You Do Not Want): Prior to drawing the surgical margin the visible lesion was removed with electrodesiccation and curettage to clearly define the lesion size.
Melolabial Interpolation Flap Text: A decision was made to reconstruct the defect utilizing an interpolation axial flap and a staged reconstruction.  A telfa template was made of the defect.  This telfa template was then used to outline the melolabial interpolation flap.  The donor area for the pedicle flap was then injected with anesthesia.  The flap was excised through the skin and subcutaneous tissue down to the layer of the underlying musculature.  The pedicle flap was carefully excised within this deep plane to maintain its blood supply.  The edges of the donor site were undermined.   The donor site was closed in a primary fashion.  The pedicle was then rotated into position and sutured.  Once the tube was sutured into place, adequate blood supply was confirmed with blanching and refill.  The pedicle was then wrapped with xeroform gauze and dressed appropriately with a telfa and gauze bandage to ensure continued blood supply and protect the attached pedicle.
Detail Level: Detailed
S Plasty Text: Given the location and shape of the defect, and the orientation of relaxed skin tension lines, an S-plasty was deemed most appropriate for repair.  Using a sterile surgical marker, the appropriate outline of the S-plasty was drawn, incorporating the defect and placing the expected incisions within the relaxed skin tension lines where possible.  The area thus outlined was incised deep to adipose tissue with a #15 scalpel blade.  The skin margins were undermined to an appropriate distance in all directions utilizing iris scissors. The skin flaps were advanced over the defect.  The opposing margins were then approximated with interrupted buried subcutaneous sutures.
Partial Purse String (Simple) Text: Given the location of the defect and the characteristics of the surrounding skin a simple purse string closure was deemed most appropriate.  Undermining was performed circumferentially around the surgical defect.  A purse string suture was then placed and tightened. Wound tension of the circular defect prevented complete closure of the wound.
Mastoid Interpolation Flap Text: A decision was made to reconstruct the defect utilizing an interpolation axial flap and a staged reconstruction.  A telfa template was made of the defect.  This telfa template was then used to outline the mastoid interpolation flap.  The donor area for the pedicle flap was then injected with anesthesia.  The flap was excised through the skin and subcutaneous tissue down to the layer of the underlying musculature.  The pedicle flap was carefully excised within this deep plane to maintain its blood supply.  The edges of the donor site were undermined.   The donor site was closed in a primary fashion.  The pedicle was then rotated into position and sutured.  Once the tube was sutured into place, adequate blood supply was confirmed with blanching and refill.  The pedicle was then wrapped with xeroform gauze and dressed appropriately with a telfa and gauze bandage to ensure continued blood supply and protect the attached pedicle.
Lab Facility: 
Mercedes Flap Text: The defect edges were debeveled with a #15 scalpel blade.  Given the location of the defect, shape of the defect and the proximity to free margins a Mercedes flap was deemed most appropriate.  Using a sterile surgical marker, an appropriate advancement flap was drawn incorporating the defect and placing the expected incisions within the relaxed skin tension lines where possible. The area thus outlined was incised deep to adipose tissue with a #15 scalpel blade.  The skin margins were undermined to an appropriate distance in all directions utilizing iris scissors.
Xenograft Text: The defect edges were debeveled with a #15 scalpel blade.  Given the location of the defect, shape of the defect and the proximity to free margins a xenograft was deemed most appropriate.  The graft was then trimmed to fit the size of the defect.  The graft was then placed in the primary defect and oriented appropriately.
Complex Repair And Double Advancement Flap Text: The defect edges were debeveled with a #15 scalpel blade.  The primary defect was closed partially with a complex linear closure.  Given the location of the remaining defect, shape of the defect and the proximity to free margins a double advancement flap was deemed most appropriate for complete closure of the defect.  Using a sterile surgical marker, an appropriate advancement flap was drawn incorporating the defect and placing the expected incisions within the relaxed skin tension lines where possible.    The area thus outlined was incised deep to adipose tissue with a #15 scalpel blade.  The skin margins were undermined to an appropriate distance in all directions utilizing iris scissors.
Complex Repair And A-T Advancement Flap Text: The defect edges were debeveled with a #15 scalpel blade.  The primary defect was closed partially with a complex linear closure.  Given the location of the remaining defect, shape of the defect and the proximity to free margins an A-T advancement flap was deemed most appropriate for complete closure of the defect.  Using a sterile surgical marker, an appropriate advancement flap was drawn incorporating the defect and placing the expected incisions within the relaxed skin tension lines where possible.    The area thus outlined was incised deep to adipose tissue with a #15 scalpel blade.  The skin margins were undermined to an appropriate distance in all directions utilizing iris scissors.
Complex Repair And Xenograft Text: The defect edges were debeveled with a #15 scalpel blade.  The primary defect was closed partially with a complex linear closure.  Given the location of the defect, shape of the defect and the proximity to free margins a xenograft was deemed most appropriate to repair the remaining defect.  The graft was trimmed to fit the size of the remaining defect.  The graft was then placed in the primary defect, oriented appropriately, and sutured into place.
Excisional Biopsy Additional Text (Leave Blank If You Do Not Want): The margin was drawn around the clinically apparent lesion. An elliptical shape was then drawn on the skin incorporating the lesion and margins.  Incisions were then made along these lines to the appropriate tissue plane and the lesion was extirpated.
Billing Type: Third-Party Bill
Complex Repair And Tissue Cultured Epidermal Autograft Text: The defect edges were debeveled with a #15 scalpel blade.  The primary defect was closed partially with a complex linear closure.  Given the location of the defect, shape of the defect and the proximity to free margins an tissue cultured epidermal autograft was deemed most appropriate to repair the remaining defect.  The graft was trimmed to fit the size of the remaining defect.  The graft was then placed in the primary defect, oriented appropriately, and sutured into place.
Complex Repair And Skin Substitute Graft Text: The defect edges were debeveled with a #15 scalpel blade.  The primary defect was closed partially with a complex linear closure.  Given the location of the remaining defect, shape of the defect and the proximity to free margins a skin substitute graft was deemed most appropriate to repair the remaining defect.  The graft was trimmed to fit the size of the remaining defect.  The graft was then placed in the primary defect, oriented appropriately, and sutured into place.
Island Pedicle Flap With Canthal Suspension Text: The defect edges were debeveled with a #15 scalpel blade.  Given the location of the defect, shape of the defect and the proximity to free margins an island pedicle advancement flap was deemed most appropriate.  Using a sterile surgical marker, an appropriate advancement flap was drawn incorporating the defect, outlining the appropriate donor tissue and placing the expected incisions within the relaxed skin tension lines where possible. The area thus outlined was incised deep to adipose tissue with a #15 scalpel blade.  The skin margins were undermined to an appropriate distance in all directions around the primary defect and laterally outward around the island pedicle utilizing iris scissors.  There was minimal undermining beneath the pedicle flap. A suspension suture was placed in the canthal tendon to prevent tension and prevent ectropion.
Double O-Z Plasty Text: The defect edges were debeveled with a #15 scalpel blade.  Given the location of the defect, shape of the defect and the proximity to free margins a Double O-Z plasty (double transposition flap) was deemed most appropriate.  Using a sterile surgical marker, the appropriate transposition flaps were drawn incorporating the defect and placing the expected incisions within the relaxed skin tension lines where possible. The area thus outlined was incised deep to adipose tissue with a #15 scalpel blade.  The skin margins were undermined to an appropriate distance in all directions utilizing iris scissors.  Hemostasis was achieved with electrocautery.  The flaps were then transposed into place, one clockwise and the other counterclockwise, and anchored with interrupted buried subcutaneous sutures.
Rhomboid Transposition Flap Text: The defect edges were debeveled with a #15 scalpel blade.  Given the location of the defect and the proximity to free margins a rhomboid transposition flap was deemed most appropriate.  Using a sterile surgical marker, an appropriate rhomboid flap was drawn incorporating the defect.    The area thus outlined was incised deep to adipose tissue with a #15 scalpel blade.  The skin margins were undermined to an appropriate distance in all directions utilizing iris scissors.
Information: Selecting Yes will display possible errors in your note based on the variables you have selected. This validation is only offered as a suggestion for you. PLEASE NOTE THAT THE VALIDATION TEXT WILL BE REMOVED WHEN YOU FINALIZE YOUR NOTE. IF YOU WANT TO FAX A PRELIMINARY NOTE YOU WILL NEED TO TOGGLE THIS TO 'NO' IF YOU DO NOT WANT IT IN YOUR FAXED NOTE.

## 2020-02-13 ENCOUNTER — APPOINTMENT (RX ONLY)
Dept: URBAN - METROPOLITAN AREA CLINIC 22 | Facility: CLINIC | Age: 60
Setting detail: DERMATOLOGY
End: 2020-02-13

## 2020-02-13 DIAGNOSIS — G90.09 OTHER IDIOPATHIC PERIPHERAL AUTONOMIC NEUROPATHY: ICD-10-CM

## 2020-02-13 DIAGNOSIS — D22 MELANOCYTIC NEVI: ICD-10-CM

## 2020-02-13 DIAGNOSIS — Z85.828 PERSONAL HISTORY OF OTHER MALIGNANT NEOPLASM OF SKIN: ICD-10-CM

## 2020-02-13 DIAGNOSIS — L57.0 ACTINIC KERATOSIS: ICD-10-CM

## 2020-02-13 PROBLEM — D48.5 NEOPLASM OF UNCERTAIN BEHAVIOR OF SKIN: Status: ACTIVE | Noted: 2020-02-13

## 2020-02-13 PROCEDURE — 11102 TANGNTL BX SKIN SINGLE LES: CPT

## 2020-02-13 PROCEDURE — ? LIQUID NITROGEN

## 2020-02-13 PROCEDURE — 99214 OFFICE O/P EST MOD 30 MIN: CPT | Mod: 25

## 2020-02-13 PROCEDURE — ? BIOPSY BY SHAVE METHOD

## 2020-02-13 PROCEDURE — ? COUNSELING

## 2020-02-13 PROCEDURE — 17000 DESTRUCT PREMALG LESION: CPT | Mod: 59

## 2020-02-13 ASSESSMENT — LOCATION DETAILED DESCRIPTION DERM
LOCATION DETAILED: LEFT MEDIAL ZYGOMA
LOCATION DETAILED: RIGHT ANTERIOR LATERAL PROXIMAL UPPER ARM
LOCATION DETAILED: LEFT LATERAL EYEBROW
LOCATION DETAILED: RIGHT SUPERIOR UPPER BACK
LOCATION DETAILED: RIGHT ELBOW

## 2020-02-13 ASSESSMENT — LOCATION SIMPLE DESCRIPTION DERM
LOCATION SIMPLE: RIGHT UPPER ARM
LOCATION SIMPLE: LEFT ZYGOMA
LOCATION SIMPLE: RIGHT ELBOW
LOCATION SIMPLE: LEFT EYEBROW
LOCATION SIMPLE: RIGHT UPPER BACK

## 2020-02-13 ASSESSMENT — LOCATION ZONE DERM
LOCATION ZONE: ARM
LOCATION ZONE: TRUNK
LOCATION ZONE: FACE

## 2020-02-13 NOTE — PROCEDURE: COUNSELING
Detail Level: Detailed
Detail Level: Simple
Detail Level: Zone
Patient Specific Counseling (Will Not Stick From Patient To Patient): Recommend he see a neurologist \\nHe has a neurosurgeon appointment scheduled

## 2022-05-25 ENCOUNTER — APPOINTMENT (OUTPATIENT)
Dept: RADIOLOGY | Facility: MEDICAL CENTER | Age: 62
End: 2022-05-25
Attending: EMERGENCY MEDICINE
Payer: COMMERCIAL

## 2022-05-25 ENCOUNTER — HOSPITAL ENCOUNTER (EMERGENCY)
Facility: MEDICAL CENTER | Age: 62
End: 2022-05-25
Attending: EMERGENCY MEDICINE
Payer: COMMERCIAL

## 2022-05-25 VITALS
DIASTOLIC BLOOD PRESSURE: 79 MMHG | OXYGEN SATURATION: 94 % | HEART RATE: 61 BPM | RESPIRATION RATE: 17 BRPM | SYSTOLIC BLOOD PRESSURE: 132 MMHG | TEMPERATURE: 97.9 F | WEIGHT: 145 LBS

## 2022-05-25 DIAGNOSIS — S06.0X0A CONCUSSION WITHOUT LOSS OF CONSCIOUSNESS, INITIAL ENCOUNTER: ICD-10-CM

## 2022-05-25 DIAGNOSIS — M54.2 NECK PAIN: ICD-10-CM

## 2022-05-25 DIAGNOSIS — H93.12 TINNITUS OF LEFT EAR: ICD-10-CM

## 2022-05-25 DIAGNOSIS — S09.90XA CLOSED HEAD INJURY, INITIAL ENCOUNTER: ICD-10-CM

## 2022-05-25 PROCEDURE — 96374 THER/PROPH/DIAG INJ IV PUSH: CPT

## 2022-05-25 PROCEDURE — 700111 HCHG RX REV CODE 636 W/ 250 OVERRIDE (IP): Performed by: EMERGENCY MEDICINE

## 2022-05-25 PROCEDURE — 70450 CT HEAD/BRAIN W/O DYE: CPT

## 2022-05-25 PROCEDURE — 72125 CT NECK SPINE W/O DYE: CPT

## 2022-05-25 PROCEDURE — 99284 EMERGENCY DEPT VISIT MOD MDM: CPT

## 2022-05-25 PROCEDURE — 96375 TX/PRO/DX INJ NEW DRUG ADDON: CPT

## 2022-05-25 RX ORDER — KETOROLAC TROMETHAMINE 30 MG/ML
30 INJECTION, SOLUTION INTRAMUSCULAR; INTRAVENOUS ONCE
Status: COMPLETED | OUTPATIENT
Start: 2022-05-25 | End: 2022-05-25

## 2022-05-25 RX ORDER — HYDROMORPHONE HYDROCHLORIDE 1 MG/ML
0.5 INJECTION, SOLUTION INTRAMUSCULAR; INTRAVENOUS; SUBCUTANEOUS
Status: DISCONTINUED | OUTPATIENT
Start: 2022-05-25 | End: 2022-05-25 | Stop reason: HOSPADM

## 2022-05-25 RX ORDER — ONDANSETRON 2 MG/ML
4 INJECTION INTRAMUSCULAR; INTRAVENOUS ONCE
Status: COMPLETED | OUTPATIENT
Start: 2022-05-25 | End: 2022-05-25

## 2022-05-25 RX ORDER — CYCLOBENZAPRINE HCL 5 MG
5-10 TABLET ORAL 3 TIMES DAILY PRN
Qty: 20 TABLET | Refills: 0 | Status: SHIPPED | OUTPATIENT
Start: 2022-05-25 | End: 2022-05-28

## 2022-05-25 RX ADMIN — KETOROLAC TROMETHAMINE 30 MG: 30 INJECTION, SOLUTION INTRAMUSCULAR; INTRAVENOUS at 14:04

## 2022-05-25 RX ADMIN — ONDANSETRON HYDROCHLORIDE 4 MG: 2 SOLUTION INTRAMUSCULAR; INTRAVENOUS at 12:50
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
## 2022-05-25 NOTE — ED NOTES
PIV removed. Discharge instructions discussed with pt, verbalizes understanding. All belongings with pt when leaving unit, including paper prescription. Pt amb to lobby with steady gait awaiting ride.

## 2023-06-28 ENCOUNTER — PATIENT OUTREACH (OUTPATIENT)
Dept: ONCOLOGY | Facility: MEDICAL CENTER | Age: 63
End: 2023-06-28
Payer: COMMERCIAL

## 2023-07-03 ENCOUNTER — HOSPITAL ENCOUNTER (OUTPATIENT)
Dept: RADIATION ONCOLOGY | Facility: MEDICAL CENTER | Age: 63
End: 2023-07-31
Attending: RADIOLOGY
Payer: COMMERCIAL

## 2023-07-03 VITALS
SYSTOLIC BLOOD PRESSURE: 110 MMHG | BODY MASS INDEX: 23.31 KG/M2 | TEMPERATURE: 97.2 F | HEIGHT: 69 IN | RESPIRATION RATE: 16 BRPM | WEIGHT: 157.41 LBS | DIASTOLIC BLOOD PRESSURE: 72 MMHG | HEART RATE: 54 BPM | OXYGEN SATURATION: 90 %

## 2023-07-03 DIAGNOSIS — C67.8 MALIGNANT NEOPLASM OF OVERLAPPING SITES OF BLADDER (HCC): ICD-10-CM

## 2023-07-03 PROCEDURE — 99214 OFFICE O/P EST MOD 30 MIN: CPT | Performed by: RADIOLOGY

## 2023-07-03 PROCEDURE — 99205 OFFICE O/P NEW HI 60 MIN: CPT | Performed by: RADIOLOGY

## 2023-07-03 RX ORDER — OXYBUTYNIN CHLORIDE 5 MG/1
5 TABLET ORAL 3 TIMES DAILY
COMMUNITY

## 2023-07-03 RX ORDER — TIOTROPIUM BROMIDE 18 UG/1
18 CAPSULE ORAL; RESPIRATORY (INHALATION) DAILY
COMMUNITY

## 2023-07-03 RX ORDER — ALBUTEROL SULFATE 90 UG/1
2 AEROSOL, METERED RESPIRATORY (INHALATION) EVERY 6 HOURS PRN
COMMUNITY

## 2023-07-03 ASSESSMENT — PAIN SCALES - GENERAL: PAINLEVEL: NO PAIN

## 2023-07-03 NOTE — CT SIMULATION
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 on 7/3/2023  Histopathologic type: Papillary transitional cell carcinoma  Stage prefix: Initial diagnosis  WHO/ISUP grade (low/high): High Grade  Histologic grading system: 2 grade system         Treatment Planning CT Simulation      Order Questions     Question Answer Comment    Is this for a new course of treatment? Yes     Is this an Addendum? No     Implanted Device/Pacemaker No     Simulation Status Initial     Planned Start Date 7/17/2023     Treatment Site Bladder     Laterality Not Applicable     Treatment Technique IMRT     Treatment Pattern/Frequency Daily 32 fractions    Concurrent Chemotherapy Yes     Ordering Provider SOLIS POLANCO     CT Technique 3D     Slice Thickness 2mm     Scan Extent Pelvis      Abdomen     Treatment Device(s) Vac Domonique     Patient Attire Gown     Patient Position Supine     Patient Orientation Head First     Bladder Empty     Treatment Machine No preference     Treatment Image Guidance CBCT     Image Guidance Match Bone     Other Orders Weekly Physics Check      Special Tx Procedure

## 2023-07-03 NOTE — CONSULTS
RADIATION ONCOLOGY CONSULT    Patient name:  Scotty Livingston    Primary Physician:  CHASE Mason MRN: [id number]  CSN: [id number]   Referring physician:  Aundrea Alvarez P.*  : 1960, 62 y.o.     DATE OF SERVICE: 7/3/2023    IDENTIFICATION: A 62 y.o. male with   Malignant neoplasm of overlapping sites of bladder (HCC)  Staging form: Urinary Bladder, AJCC 8th Edition  - Clinical stage from 7/3/2023: Stage II (cT2, cN0, cM0) - Signed by Flavia Babb M.D. on 7/3/2023  Histopathologic type: Papillary transitional cell carcinoma  Stage prefix: Initial diagnosis  WHO/ISUP grade (low/high): High Grade  Histologic grading system: 2 grade system        He is here at the kind request of Aundrea Alvarez       HISTORY OF PRESENT ILLNESS:  Subjective     This is a 62-year-old gentleman who comes to discuss bladder preservation therapy for his recently diagnosed muscle invasive bladder cancer.  In brief, he reports that he has had an overactive bladder with frequent urination about every 1 hour over the last 2 years.  In addition, he developed intermittent hematuria about 6 months ago.      This led to a consult to urology in May of this year in the setting of a CT scan that revealed a 4.9 cm mass in the left wall of the bladder, no obvious adenopathy, concerning for neoplasm.  He subsequently had a cystoscopy and a TURBT performed that revealed a 5 cm mass in the left bladder wall, lateral to the left UO, abutting the bladder neck.  Final pathology from the TURBT specimen spanning over 5 cm was invasive urothelial carcinoma, invading the muscularis propria.  No grading is present.    Of note, he was also prescribed oxybutynin for his overactive bladder, and his urinary frequency has improved tremendously.  As a result of his pathology, he was then referred to medical oncology and saw Aundrea Alvarez.  He says they discussed surgery versus bladder preservation, and a strongly preserve his  bladder preservation.  That is why he is referred to us today.  A CT of the chest is pending for completion of work-up.  With regards to medical oncology, he does apparently have good urinary and renal function, no obvious hydronephrosis, no other obvious medical contraindications, and the likely the plan would be for low-dose gemcitabine versus a mitomycin based treatment along with concurrent radiation.    He now comes to review bladder preservation further today.  Currently feeling well, no fevers or chills, occasionally has what he describes as foul-smelling urine, though this was in the setting of his recent cystoscopy, and has not recurred since then.  No further hematuria.  Good urinary function.  Good erectile function.        PROBLEM LIST:  Patient Active Problem List   Diagnosis    Malignant neoplasm of overlapping sites of bladder (HCC)        PAST SURGICAL HISTORY:  Past Surgical History:   Procedure Laterality Date    TRANS URETHRAL RESECTION BLADDER  05/30/2023       CURRENT MEDICATIONS:  Current Outpatient Medications   Medication Sig Dispense Refill    oxybutynin (DITROPAN) 5 MG Tab Take 5 mg by mouth 3 times a day.      tiotropium (SPIRIVA) 18 MCG Cap Place 18 mcg into inhaler and inhale every day.      albuterol 108 (90 Base) MCG/ACT Aero Soln inhalation aerosol Inhale 2 Puffs every 6 hours as needed for Shortness of Breath.       No current facility-administered medications for this encounter.       ALLERGIES:    Dilaudid [hydromorphone], Duloxetine hcl, Gabapentin, and Morphine    FAMILY HISTORY:    family history is not on file.    SOCIAL HISTORY:     reports that he has been smoking cigarettes. He has never used smokeless tobacco. He reports that he does not currently use alcohol. He reports current drug use. Drugs: Inhaled, Oral, and Marijuana.  Patient is single and currently lives alone in [place]. He is a retired .    REVIEW OF SYSTEMS:    A complete review of systems taken.  "Pertinent items in HPI. All others negative.    PHYSICAL EXAM:    PERFORMANCE STATUS:      7/3/2023     9:23 AM   ECOG Performance Review   ECOG Performance Status Fully active, able to carry on all pre-disease performance without restriction         7/3/2023     9:24 AM   Karnofsky Score   Karnofsky Score 90     /72   Pulse (!) 54   Temp 36.2 °C (97.2 °F)   Resp 16   Ht 1.753 m (5' 9\")   Wt 71.4 kg (157 lb 6.5 oz)   SpO2 90%   BMI 23.25 kg/m²   Physical Exam  Constitutional:       Appearance: Normal appearance.   HENT:      Head: Normocephalic and atraumatic.   Eyes:      Extraocular Movements: Extraocular movements intact.      Conjunctiva/sclera: Conjunctivae normal.   Cardiovascular:      Rate and Rhythm: Normal rate and regular rhythm.   Pulmonary:      Effort: Pulmonary effort is normal.      Breath sounds: Normal breath sounds.   Abdominal:      General: Abdomen is flat.      Palpations: Abdomen is soft.   Musculoskeletal:         General: Normal range of motion.   Neurological:      General: No focal deficit present.      Mental Status: He is alert and oriented to person, place, and time.          LABORATORY DATA:   No results found for: WBC, RBC, HEMOGLOBIN, HEMATOCRIT, MCV, MCH, MCHC, RDW, PLATELETCT, MPV, NEUTSPOLYS, LYMPHOCYTES, MONOCYTES, EOSINOPHILS, BASOPHILS, HYPOCHROMIA, ANISOCYTOSIS   No results found for: SODIUM, POTASSIUM, CHLORIDE, CO2, GLUCOSE, BUN, CREATININE, BUNCREATRAT, GLOMRATE        RADIOLOGY DATA: Reviewed outside scans in detail personally.    IMPRESSION:    A 62 y.o. with  Malignant neoplasm of overlapping sites of bladder (HCC)  Staging form: Urinary Bladder, AJCC 8th Edition  - Clinical stage from 7/3/2023: Stage II (cT2, cN0, cM0) - Signed by Flavia Babb M.D. on 7/3/2023  Histopathologic type: Papillary transitional cell carcinoma  Stage prefix: Initial diagnosis  WHO/ISUP grade (low/high): High Grade  Histologic grading system: 2 grade " system        RECOMMENDATIONS:   I had a long discussion with him today regarding his recent diagnosis of muscle invasive bladder cancer.  I outlined to him that he is apparently stage II disease, with at least his scan showing T2N0 disease with nothing outside of the bladder.  He is certainly a good definitive systemic to make candidate, and I discussed with him the standard of care for definitive bladder management including neoadjuvant chemotherapy followed by definitive resection.  He strongly declined definitive cystectomy for concerns of his permanent ostomy, though we discussed the options for bladder reconstruction as well.  At any rate, he wishes to proceed with bladder preservation therapy, and therefore we next discussed what bladder preservation would entail with concurrent chemoradiation, the need for surveillance cystectomies going forward, and the expected acute and long-term toxicities, including bladder dysfunction, erectile dysfunction, strictures and fibrosis, as well as various chemotherapy related side effects.  We reviewed the outcomes of bladder preservation particularly in comparison to surgical outcomes, the possible need for salvage cystectomy if he has a limited pelvic recurrence, the role of chemotherapy and immunotherapy and metastatic disease, and both long-term survival as well as long-term bladder preservation outcomes.    After long discussion, he wishes to proceed with chemoradiation as planned, and CT simulation is tentatively scheduled for later this week.  We will plan to start definitive radiation along with chemotherapy with likely first treatment on July 17.  We will likely plan definitive treatment to the whole bladder to a dose of likely 60-66 Mejia in approximately 30 fractions, but depending on his simulation scan, could also proceed with 55 Gray in 20 fractions.    Thank you for the opportunity to participate in his care.  If any questions or comments, please do not  hesitate in calling.    Orders Placed This Encounter    Rad Onc Treatment Planning CT Simulation    oxybutynin (DITROPAN) 5 MG Tab    tiotropium (SPIRIVA) 18 MCG Cap    albuterol 108 (90 Base) MCG/ACT Aero Soln inhalation aerosol

## 2023-07-05 ENCOUNTER — PATIENT OUTREACH (OUTPATIENT)
Dept: ONCOLOGY | Facility: MEDICAL CENTER | Age: 63
End: 2023-07-05
Payer: COMMERCIAL

## 2023-07-05 NOTE — PROGRESS NOTES
"Call placed to pt to follow up on DST 5/10. Pt states \"oj got issues!\" He states his car is broken down and he cannot fix it at this time. [facility]. TONI provided pt with two numbers for transportation services through the [facility]. -[phone] and [facility] [phone]. Pt took numbers down and will try to call today. Pt will have SIM tomorrow 7/6 and will be present.   "

## 2023-07-06 ENCOUNTER — HOSPITAL ENCOUNTER (OUTPATIENT)
Dept: RADIATION ONCOLOGY | Facility: MEDICAL CENTER | Age: 63
End: 2023-07-06

## 2023-07-06 PROCEDURE — 77470 SPECIAL RADIATION TREATMENT: CPT | Mod: 26 | Performed by: RADIOLOGY

## 2023-07-06 PROCEDURE — 77290 THER RAD SIMULAJ FIELD CPLX: CPT | Performed by: RADIOLOGY

## 2023-07-06 PROCEDURE — 77470 SPECIAL RADIATION TREATMENT: CPT | Performed by: RADIOLOGY

## 2023-07-06 PROCEDURE — 77334 RADIATION TREATMENT AID(S): CPT | Mod: 26 | Performed by: RADIOLOGY

## 2023-07-06 PROCEDURE — 77263 THER RADIOLOGY TX PLNG CPLX: CPT | Performed by: RADIOLOGY

## 2023-07-06 PROCEDURE — 77334 RADIATION TREATMENT AID(S): CPT | Performed by: RADIOLOGY

## 2023-07-06 NOTE — RADIATION PLANNING NOTES
Clinical Treatment Planning Note    DATE OF SERVICE: 7/6/2023    DIAGNOSIS:  Malignant neoplasm of overlapping sites of bladder (HCC)  Staging form: Urinary Bladder, AJCC 8th Edition  - Clinical stage from 7/3/2023: Stage II (cT2, cN0, cM0) - Signed by Flavia Babb M.D. on 7/3/2023  Histopathologic type: Papillary transitional cell carcinoma  Stage prefix: Initial diagnosis  WHO/ISUP grade (low/high): High Grade  Histologic grading system: 2 grade system         IMAGING REVIEWED:  [x] CT     [] MRI     [] PET/CT     [] BONE SCAN     [] MAMMO     [] OTHER      TREATMENT INTENT:   [x] CURATIVE     [] MAINTENANCE     []  PALLIATIVE      []  SUPPORTIVE     []  PROPHYLACTIC     [] BENIGN     []  CONSOLIDATIVE      [] DEFINITIVE   []  OLOGIMETASTATIC      LINE OF TREATMENT:  [] ADJUVANT   [x] DEFINITIVE   [] NEOADJUVANT   [] RE-TREATMENT      TECHNIQUE PLANNED:  [x] IMRT   [] 3D   [] SBRT   [] SRS/SRT   [] HDR   [] ELECTRON       IMRT JUSTIFICATION:  []   An immediately adjacent area has been previously irradiated and abutting portals must be established with high precision.    []  Dose escalation is planned to deliver radiation doses in excess of those commonly utilized for similar tumors with conventional treatment.    [x]  The target volume is concave or convex, and the critical normal tissues are within or around that convexity or concavity.    [x]  The target volume is in close proximity to critical structures that must be protected.    [x]  The volume of interest must be covered with narrow margins to adequately protect  immediately adjacent structures.      FIELDS & BLOCKING:  [x] COMPLEX BLOCKS     []  = 3 TX AREAS     []  ARCS     []  CUSTOM SHEILD        []  SIMPLE BLOCK      CHEMOTHERAPY:  [x]  CONCURRENT     []  INDUCTION     [] SEQUENTIAL     []  <30 DAYS FROM XRT      NOTES:  OAR CONSTRAINTS: (GUIDELINES ONLY NOT ABSOLUTE)   Target Prescribed Coverage   PTVboost 100% of PTVboost covered by 95% (cGy) of RX  no Dose     PTVinitial 100% of PTVinitial covered by 95% (Gy) of RX Dose       ELVER Goal   Plan Hot Spot Max Dose < 110%   Rectum V60Gy < 35%   Rectum V65Gy < 25%   Rectum V70Gy < 20%   Rectum V75Gy < 15%   Bladder V65Gy < 50%   Bladder V70Gy < 35%   Bladder V75Gy < 25%   Bladder V80Gy < 15%   R Femoral Head Max Dose < 50Gy   L Femoral Head Max Dose < 50Gy   individual Small Bowel Loops V15Gy < 120cc   Entire Cavity Small Bowel V45Gy < 195cc   Penile Bulb Mean Dose < 52.5Gy   *RTOG 0924, RTOG 1115, QUANTEC

## 2023-07-06 NOTE — RADIATION PLANNING NOTES
DATE OF SERVICE: 7/6/2023    DIAGNOSIS:  Malignant neoplasm of overlapping sites of bladder (HCC)  Staging form: Urinary Bladder, AJCC 8th Edition  - Clinical stage from 7/3/2023: Stage II (cT2, cN0, cM0) - Signed by Flavia Babb M.D. on 7/3/2023  Histopathologic type: Papillary transitional cell carcinoma  Stage prefix: Initial diagnosis  WHO/ISUP grade (low/high): High Grade  Histologic grading system: 2 grade system       DATE OF SERVICE: 7/6/2023    TYPE OF SIMULATION: Pelvis    GOAL OF TREATMENT:   [x] Curative  [] Palliative  [] Oligometastatic    CONTRAST:    [] IV Contrast*  [] Small Bowel  [] Rectal  [] Urethral              POSITION:    [x]  Supine  [] Prone with belly board    COMPLEX:  [x] Complex Blocking   []Arcs  [] Custom Blocks  [] >3 Sites    PROCEDURE: Patient positioned on CT table in Vac-Domonique immobilization device with or without belly board depending on position. CT acquired thorough the entire volume of interest.  Images reviewed and exported to treatment planning system.    I have personally reviewed the relevant data, performed the target localization, and determined all relevant factors for this patient’s simulation.    *Omnipaque 80 -100cc IVP in conjunction with 500cc NS

## 2023-07-06 NOTE — RADIATION PLANNING NOTES
PATIENT NAME Scotty Livingston   PRIMARY PHYSICIAN April Dorman OMAR [id number]   REFERRING PHYSICIAN No ref. provider found 1960       DATE OF SERVICE: 7/6/2023    DIAGNOSIS:  Malignant neoplasm of overlapping sites of bladder (HCC)  Staging form: Urinary Bladder, AJCC 8th Edition  - Clinical stage from 7/3/2023: Stage II (cT2, cN0, cM0) - Signed by Flavia Babb M.D. on 7/3/2023  Histopathologic type: Papillary transitional cell carcinoma  Stage prefix: Initial diagnosis  WHO/ISUP grade (low/high): High Grade  Histologic grading system: 2 grade system       SPECIAL TREATMENT PROCEDURE NOTE:  Considerable additional effort required in the management of this case because of administration of concurrent  chemotherapy which may result in increased normal tissue toxicity. This includes longer and possibly more frequent on treatment visits.

## 2023-07-12 ENCOUNTER — PATIENT OUTREACH (OUTPATIENT)
Dept: ONCOLOGY | Facility: MEDICAL CENTER | Age: 63
End: 2023-07-12
Payer: COMMERCIAL

## 2023-07-12 NOTE — PROGRESS NOTES
Call placed to pt to check on him and his transportation needs. Pt states he is working on getting his car working again and will know more about that today. [facility] have not been pursued at this time. Will follow back up with pt soon.

## 2023-07-13 PROCEDURE — 77300 RADIATION THERAPY DOSE PLAN: CPT | Performed by: RADIOLOGY

## 2023-07-13 PROCEDURE — 77338 DESIGN MLC DEVICE FOR IMRT: CPT | Performed by: RADIOLOGY

## 2023-07-13 PROCEDURE — 77300 RADIATION THERAPY DOSE PLAN: CPT | Mod: 26 | Performed by: RADIOLOGY

## 2023-07-13 PROCEDURE — 77301 RADIOTHERAPY DOSE PLAN IMRT: CPT | Performed by: RADIOLOGY

## 2023-07-13 PROCEDURE — 77301 RADIOTHERAPY DOSE PLAN IMRT: CPT | Mod: 26 | Performed by: RADIOLOGY

## 2023-07-13 PROCEDURE — 77338 DESIGN MLC DEVICE FOR IMRT: CPT | Mod: 26 | Performed by: RADIOLOGY

## 2023-07-17 ENCOUNTER — HOSPITAL ENCOUNTER (OUTPATIENT)
Dept: RADIATION ONCOLOGY | Facility: MEDICAL CENTER | Age: 63
End: 2023-07-17

## 2023-07-17 LAB
CHEMOTHERAPY INFUSION START DATE: NORMAL
CHEMOTHERAPY RECORDS: 2.75
CHEMOTHERAPY RECORDS: 5500
CHEMOTHERAPY RECORDS: NORMAL
CHEMOTHERAPY RX CANCER: NORMAL
DATE 1ST CHEMO CANCER: NORMAL
RAD ONC ARIA COURSE LAST TREATMENT DATE: NORMAL
RAD ONC ARIA COURSE TREATMENT ELAPSED DAYS: NORMAL
RAD ONC ARIA REFERENCE POINT DOSAGE GIVEN TO DATE: 2.75
RAD ONC ARIA REFERENCE POINT ID: NORMAL
RAD ONC ARIA REFERENCE POINT SESSION DOSAGE GIVEN: 2.75

## 2023-07-17 PROCEDURE — 77386 HCHG IMRT DELIVERY COMPLEX: CPT | Performed by: RADIOLOGY

## 2023-07-17 PROCEDURE — 77280 THER RAD SIMULAJ FIELD SMPL: CPT | Performed by: RADIOLOGY

## 2023-07-17 NOTE — CT SIMULATION
DATE OF SERVICE: 7/17/2023    Radiation Therapy Episodes       Active Episodes       Radiation Therapy: IMRT (7/17/2023)                   Radiation Treatments         Plan Last Treated On Elapsed Days Fractions Treated Prescribed Fraction Dose (cGy) Prescribed Total Dose (cGy)    Bladder 7/17/2023 0 @ [id number] 1 of 20 275 5,500                  Reference Point Last Treated On Elapsed Days Most Recent Session Dose (cGy) Total Dose (cGy)    PTV 7/17/2023 0 @ [id number] 275 275                            First Visit Simple Simulation: Called by Truebeam machine to verify treatment parameters including:  treatment site, treatment dose, and treatment setup prior to first treatment. Image derived shifts reviewed in all appropriate planes.  Shifts approved.  Patient treated.    I have personally reviewed the relevant data, performed the target localization, and determined all relevant factors for this patient’s simulation.

## 2023-07-18 ENCOUNTER — HOSPITAL ENCOUNTER (OUTPATIENT)
Dept: RADIATION ONCOLOGY | Facility: MEDICAL CENTER | Age: 63
End: 2023-07-18
Payer: COMMERCIAL

## 2023-07-18 ENCOUNTER — PATIENT OUTREACH (OUTPATIENT)
Dept: ONCOLOGY | Facility: MEDICAL CENTER | Age: 63
End: 2023-07-18
Payer: COMMERCIAL

## 2023-07-18 LAB
CHEMOTHERAPY INFUSION START DATE: NORMAL
CHEMOTHERAPY RECORDS: 2.75
CHEMOTHERAPY RECORDS: 5500
CHEMOTHERAPY RECORDS: NORMAL
CHEMOTHERAPY RX CANCER: NORMAL
DATE 1ST CHEMO CANCER: NORMAL
RAD ONC ARIA COURSE LAST TREATMENT DATE: NORMAL
RAD ONC ARIA COURSE TREATMENT ELAPSED DAYS: NORMAL
RAD ONC ARIA REFERENCE POINT DOSAGE GIVEN TO DATE: 5.5
RAD ONC ARIA REFERENCE POINT ID: NORMAL
RAD ONC ARIA REFERENCE POINT SESSION DOSAGE GIVEN: 2.75

## 2023-07-18 PROCEDURE — 77014 PR CT GUIDANCE PLACEMENT RAD THERAPY FIELDS: CPT | Mod: 26 | Performed by: RADIOLOGY

## 2023-07-18 PROCEDURE — 77386 HCHG IMRT DELIVERY COMPLEX: CPT | Performed by: RADIOLOGY

## 2023-07-18 NOTE — PROGRESS NOTES
Pt called this am stating he had a conflict of appts. He had radiation scheduled for 10. But [facility]. Spoke with rad tech to change appt to 3pm today so pt could make both appts. Called back to pt to update and he agrees with new plan. [facility] since all his future radiation appts are at 10 am and will want to avoid conflicting appts.

## 2023-07-19 ENCOUNTER — HOSPITAL ENCOUNTER (OUTPATIENT)
Dept: RADIATION ONCOLOGY | Facility: MEDICAL CENTER | Age: 63
End: 2023-07-19
Payer: COMMERCIAL

## 2023-07-19 VITALS
DIASTOLIC BLOOD PRESSURE: 75 MMHG | WEIGHT: 156 LBS | SYSTOLIC BLOOD PRESSURE: 125 MMHG | HEART RATE: 74 BPM | BODY MASS INDEX: 23.04 KG/M2 | RESPIRATION RATE: 18 BRPM | OXYGEN SATURATION: 93 %

## 2023-07-19 LAB
CHEMOTHERAPY INFUSION START DATE: NORMAL
CHEMOTHERAPY RECORDS: 2.75
CHEMOTHERAPY RECORDS: 5500
CHEMOTHERAPY RECORDS: NORMAL
CHEMOTHERAPY RX CANCER: NORMAL
DATE 1ST CHEMO CANCER: NORMAL
RAD ONC ARIA COURSE LAST TREATMENT DATE: NORMAL
RAD ONC ARIA COURSE TREATMENT ELAPSED DAYS: NORMAL
RAD ONC ARIA REFERENCE POINT DOSAGE GIVEN TO DATE: 8.25
RAD ONC ARIA REFERENCE POINT ID: NORMAL
RAD ONC ARIA REFERENCE POINT SESSION DOSAGE GIVEN: 2.75

## 2023-07-19 PROCEDURE — 77386 HCHG IMRT DELIVERY COMPLEX: CPT | Performed by: RADIOLOGY

## 2023-07-19 PROCEDURE — 77014 PR CT GUIDANCE PLACEMENT RAD THERAPY FIELDS: CPT | Mod: 26 | Performed by: RADIOLOGY

## 2023-07-19 PROCEDURE — 77336 RADIATION PHYSICS CONSULT: CPT | Performed by: RADIOLOGY

## 2023-07-19 ASSESSMENT — PAIN SCALES - GENERAL: PAINLEVEL: NO PAIN

## 2023-07-19 NOTE — ON TREATMENT VISIT
"  ON TREATMENT  NOTE  RADIATION ONCOLOGY DEPARTMENT    Patient name:  Scotty Livingston    Primary Physician:  CHASE Mason MRN: [id number]  CSN: [id number]   Referring physician:  Aundrea Alvarez P.*   : 1960, 62 y.o.     ENCOUNTER DATE:  2023      DIAGNOSIS:  Malignant neoplasm of overlapping sites of bladder (HCC)  Staging form: Urinary Bladder, AJCC 8th Edition  - Clinical stage from 7/3/2023: Stage II (cT2, cN0, cM0) - Signed by Flavia Babb M.D. on 7/3/2023  Histopathologic type: Papillary transitional cell carcinoma  Stage prefix: Initial diagnosis  WHO/ISUP grade (low/high): High Grade  Histologic grading system: 2 grade system      TREATMENT SUMMARY:  Course First Treatment Date 2023  Course Last Treatment Date 2023  Aria Treatment Information          2023   Aria Course Treatment Dates   Course First Treatment Date 2023    Course Last Treatment Date 2023    Aria Treatment Summary   Bladder  Plan from Course C1_Whole bladder   Fraction 3 of 20   Elapsed Course Days 2 @ [id number]   Prescribed Fraction Dose 275 cGy   Prescribed Total Dose 5,500 cGy   PTV  Reference Point from Course C1_Whole bladder   Elapsed Course Days 2 @ [id number]   Session Dose 275 cGy   Total Dose 825 cGy          SUBJECTIVE:  Voiding well, no hematuria, tolerated chemotherapy relatively well yesterday, mild nausea this morning, but otherwise no major complaints.    VITAL SIGNS:      2023     9:55 AM 7/3/2023     9:09 AM 2022     2:00 PM 2022    12:50 PM 2022    11:38 AM 2022    11:36 AM   Vitals   SYSTOLIC 125 110 132  160    DIASTOLIC 75 72 79  88    Pulse 74 54 61  63    Temperature  36.2 °C (97.2 °F) 36.6 °C (97.9 °F)  36.7 °C (98 °F)    Respiration 18 16 17  18    Weight 156 157.41    145   Height  1.753 m (5' 9\")       BMI 23.04 kg/m2 23.25 kg/m2       Pulse Oximetry 93 % 90 % 94 % 96 % 92 %      KPS: 90, Able to carry on normal activity; " minor signs or symptoms of disease (ECOG equivalent 0)  Encounter Vitals  Blood Pressure: 125/75  Pulse: 74  Respiration: 18  Pulse Oximetry: 93 %  Weight: 70.8 kg (156 lb)  Pain Score: No pain      7/19/2023     9:55 AM 7/3/2023     9:09 AM   Pain Assessment   Pain Score NO PAIN NO PAIN          PHYSICAL EXAM:  Physical Exam  Constitutional:       Appearance: Normal appearance.   Eyes:      Extraocular Movements: Extraocular movements intact.      Conjunctiva/sclera: Conjunctivae normal.   Abdominal:      General: Abdomen is flat. There is no distension.      Palpations: Abdomen is soft.      Tenderness: There is no abdominal tenderness.   Musculoskeletal:         General: Normal range of motion.   Neurological:      General: No focal deficit present.      Mental Status: He is alert and oriented to person, place, and time.              7/19/2023     9:56 AM   Toxicity Assessment   Toxicity Assessment Male Pelvis   Fatigue (lethargy, malaise, asthenia) Moderate (e.g., decrease in performance status by 1 ECOG level or 20% Karnofsky) or causing difficulty performing some activities   Radiation Dermatitis None   Anorexia Oral intake significantly decreased   Colitis None   Constipation None   Dehydration Dry mucous membranes and/or diminished skin turgor   Diarrhea w/o Colostomy None   Flatulence Mild   Nausea Oral intake significantly decreased   Proctitis None   Vomiting 1 episode in 24 hours over pretreatment   RT - Pain due to RT None   Tumor Pain (onset or exacerbation of tumor pain due to treatment) None   Dysuria (painful urination) None   Urinary Frequency Increase in frequency up to 2x normal   Urinary Urgency Present   Bladder Spasms Absent   Incontinence None   Urinary Retention Normal       CURRENT MEDICATIONS:    Current Outpatient Medications:     oxybutynin (DITROPAN) 5 MG Tab, Take 5 mg by mouth 3 times a day., Disp: , Rfl:     tiotropium (SPIRIVA) 18 MCG Cap, Place 18 mcg into inhaler and inhale every  day., Disp: , Rfl:     albuterol 108 (90 Base) MCG/ACT Aero Soln inhalation aerosol, Inhale 2 Puffs every 6 hours as needed for Shortness of Breath., Disp: , Rfl:     LABORATORY DATA:   No results found for: SODIUM, POTASSIUM, CHLORIDE, CO2, GLUCOSE, BUN, CREATININE, BUNCREATRAT, GLOMRATE    No results found for: WBC, RBC, HEMOGLOBIN, HEMATOCRIT, MCV, MCH, MCHC, PLATELETCT      RADIOLOGY DATA:  No results found.    IMPRESSION:  Cancer Staging   Malignant neoplasm of overlapping sites of bladder (HCC)  Staging form: Urinary Bladder, AJCC 8th Edition  - Clinical stage from 7/3/2023: Stage II (cT2, cN0, cM0) - Signed by Flavia Babb M.D. on 7/3/2023      PLAN:  No change in treatment plan    Disposition:  Treatment plan and imaging reviewed. Questions answered. Continue therapy outlined.     Flavia Babb M.D.    No orders of the defined types were placed in this encounter.

## 2023-07-20 ENCOUNTER — HOSPITAL ENCOUNTER (OUTPATIENT)
Dept: RADIATION ONCOLOGY | Facility: MEDICAL CENTER | Age: 63
End: 2023-07-20
Payer: COMMERCIAL

## 2023-07-20 LAB
CHEMOTHERAPY INFUSION START DATE: NORMAL
CHEMOTHERAPY RECORDS: 2.75
CHEMOTHERAPY RECORDS: 5500
CHEMOTHERAPY RECORDS: NORMAL
CHEMOTHERAPY RX CANCER: NORMAL
DATE 1ST CHEMO CANCER: NORMAL
RAD ONC ARIA COURSE LAST TREATMENT DATE: NORMAL
RAD ONC ARIA COURSE TREATMENT ELAPSED DAYS: NORMAL
RAD ONC ARIA REFERENCE POINT DOSAGE GIVEN TO DATE: 11
RAD ONC ARIA REFERENCE POINT ID: NORMAL
RAD ONC ARIA REFERENCE POINT SESSION DOSAGE GIVEN: 2.75

## 2023-07-20 PROCEDURE — 77014 PR CT GUIDANCE PLACEMENT RAD THERAPY FIELDS: CPT | Mod: 26 | Performed by: RADIOLOGY

## 2023-07-20 PROCEDURE — 77386 HCHG IMRT DELIVERY COMPLEX: CPT | Performed by: RADIOLOGY

## 2023-07-21 ENCOUNTER — HOSPITAL ENCOUNTER (OUTPATIENT)
Dept: RADIATION ONCOLOGY | Facility: MEDICAL CENTER | Age: 63
End: 2023-07-21
Payer: COMMERCIAL

## 2023-07-21 LAB
CHEMOTHERAPY INFUSION START DATE: NORMAL
CHEMOTHERAPY RECORDS: 2.75
CHEMOTHERAPY RECORDS: 5500
CHEMOTHERAPY RECORDS: NORMAL
CHEMOTHERAPY RX CANCER: NORMAL
DATE 1ST CHEMO CANCER: NORMAL
RAD ONC ARIA COURSE LAST TREATMENT DATE: NORMAL
RAD ONC ARIA COURSE TREATMENT ELAPSED DAYS: NORMAL
RAD ONC ARIA REFERENCE POINT DOSAGE GIVEN TO DATE: 13.75
RAD ONC ARIA REFERENCE POINT ID: NORMAL
RAD ONC ARIA REFERENCE POINT SESSION DOSAGE GIVEN: 2.75

## 2023-07-21 PROCEDURE — 77427 RADIATION TX MANAGEMENT X5: CPT | Performed by: RADIOLOGY

## 2023-07-21 PROCEDURE — 77014 PR CT GUIDANCE PLACEMENT RAD THERAPY FIELDS: CPT | Mod: 26 | Performed by: RADIOLOGY

## 2023-07-21 PROCEDURE — 77386 HCHG IMRT DELIVERY COMPLEX: CPT | Performed by: RADIOLOGY

## 2023-07-24 ENCOUNTER — HOSPITAL ENCOUNTER (OUTPATIENT)
Dept: RADIATION ONCOLOGY | Facility: MEDICAL CENTER | Age: 63
End: 2023-07-24
Payer: COMMERCIAL

## 2023-07-24 LAB
CHEMOTHERAPY INFUSION START DATE: NORMAL
CHEMOTHERAPY RECORDS: 2.75
CHEMOTHERAPY RECORDS: 5500
CHEMOTHERAPY RECORDS: NORMAL
CHEMOTHERAPY RX CANCER: NORMAL
DATE 1ST CHEMO CANCER: NORMAL
RAD ONC ARIA COURSE LAST TREATMENT DATE: NORMAL
RAD ONC ARIA COURSE TREATMENT ELAPSED DAYS: NORMAL
RAD ONC ARIA REFERENCE POINT DOSAGE GIVEN TO DATE: 16.5
RAD ONC ARIA REFERENCE POINT ID: NORMAL
RAD ONC ARIA REFERENCE POINT SESSION DOSAGE GIVEN: 2.75

## 2023-07-24 PROCEDURE — 77014 PR CT GUIDANCE PLACEMENT RAD THERAPY FIELDS: CPT | Mod: 26 | Performed by: RADIOLOGY

## 2023-07-24 PROCEDURE — 77386 HCHG IMRT DELIVERY COMPLEX: CPT | Performed by: RADIOLOGY

## 2023-07-25 ENCOUNTER — PATIENT OUTREACH (OUTPATIENT)
Dept: ONCOLOGY | Facility: MEDICAL CENTER | Age: 63
End: 2023-07-25
Payer: COMMERCIAL

## 2023-07-25 ENCOUNTER — HOSPITAL ENCOUNTER (OUTPATIENT)
Dept: RADIATION ONCOLOGY | Facility: MEDICAL CENTER | Age: 63
End: 2023-07-25
Payer: COMMERCIAL

## 2023-07-25 LAB
CHEMOTHERAPY INFUSION START DATE: NORMAL
CHEMOTHERAPY RECORDS: 2.75
CHEMOTHERAPY RECORDS: 5500
CHEMOTHERAPY RECORDS: NORMAL
CHEMOTHERAPY RX CANCER: NORMAL
DATE 1ST CHEMO CANCER: NORMAL
RAD ONC ARIA COURSE LAST TREATMENT DATE: NORMAL
RAD ONC ARIA COURSE TREATMENT ELAPSED DAYS: NORMAL
RAD ONC ARIA REFERENCE POINT DOSAGE GIVEN TO DATE: 19.25
RAD ONC ARIA REFERENCE POINT ID: NORMAL
RAD ONC ARIA REFERENCE POINT SESSION DOSAGE GIVEN: 2.75

## 2023-07-25 PROCEDURE — 77386 HCHG IMRT DELIVERY COMPLEX: CPT | Performed by: RADIOLOGY

## 2023-07-25 PROCEDURE — 77014 PR CT GUIDANCE PLACEMENT RAD THERAPY FIELDS: CPT | Mod: 26 | Performed by: RADIOLOGY

## 2023-07-25 NOTE — PROGRESS NOTES
"Met pt in lobby of [facility]. Pt expressed he is doing well today and [facility] has been going well for him. However, pt also expressed anticipatory anxiety [facility] after [facility]. He has been having a lot of N/V/flu like symptoms and extreme lethargy several hours after an infusion and lasting many hours after. Pt has zofran to take PRN and states this has not been enough to mitigate his symptoms. ONN encouraged pt to attend his appt today and discuss this all with the RN there before treatment. Pt also expressed numbness and tingling in his R arm that is constant. It started recently. He states it is similar in feeling to his L arm in the past. In the past pt had an issue with his L arm where he woke up and the arm was \"dead\". Pt is AAO and not exhibiting other symptoms of stroke or heartattack.   TONI also [facility] to [facility] and spoke with SISSY Leija who will be seeing the pt today. TONI asked for increased nausea premedication before treatment and better control for home use. TONI also informed her of the numbness and tingling that pt is experiencing.   "

## 2023-07-26 ENCOUNTER — HOSPITAL ENCOUNTER (OUTPATIENT)
Dept: RADIATION ONCOLOGY | Facility: MEDICAL CENTER | Age: 63
End: 2023-07-26
Payer: COMMERCIAL

## 2023-07-26 VITALS
DIASTOLIC BLOOD PRESSURE: 93 MMHG | HEART RATE: 73 BPM | WEIGHT: 158.29 LBS | OXYGEN SATURATION: 93 % | BODY MASS INDEX: 23.38 KG/M2 | SYSTOLIC BLOOD PRESSURE: 137 MMHG | TEMPERATURE: 96.9 F

## 2023-07-26 LAB
CHEMOTHERAPY INFUSION START DATE: NORMAL
CHEMOTHERAPY RECORDS: 2.75
CHEMOTHERAPY RECORDS: 5500
CHEMOTHERAPY RECORDS: NORMAL
CHEMOTHERAPY RX CANCER: NORMAL
DATE 1ST CHEMO CANCER: NORMAL
RAD ONC ARIA COURSE LAST TREATMENT DATE: NORMAL
RAD ONC ARIA COURSE TREATMENT ELAPSED DAYS: NORMAL
RAD ONC ARIA REFERENCE POINT DOSAGE GIVEN TO DATE: 22
RAD ONC ARIA REFERENCE POINT ID: NORMAL
RAD ONC ARIA REFERENCE POINT SESSION DOSAGE GIVEN: 2.75

## 2023-07-26 PROCEDURE — 77386 HCHG IMRT DELIVERY COMPLEX: CPT | Performed by: RADIOLOGY

## 2023-07-26 PROCEDURE — 77336 RADIATION PHYSICS CONSULT: CPT | Performed by: RADIOLOGY

## 2023-07-26 PROCEDURE — 77014 PR CT GUIDANCE PLACEMENT RAD THERAPY FIELDS: CPT | Mod: 26 | Performed by: RADIOLOGY

## 2023-07-26 ASSESSMENT — PAIN SCALES - GENERAL: PAINLEVEL: 5=MODERATE PAIN

## 2023-07-26 NOTE — ON TREATMENT VISIT
"  ON TREATMENT  NOTE  RADIATION ONCOLOGY DEPARTMENT    Patient name:  Scotty Livingston    Primary Physician:  CHASE Mason MRN: [id number]  [facility]: [id number]   Referring physician:  Aundrea Alvarez P.*   : 1960, 62 y.o.     ENCOUNTER DATE:  2023      DIAGNOSIS:  Malignant neoplasm of overlapping sites of bladder (HCC)  Staging form: Urinary Bladder, AJCC 8th Edition  - Clinical stage from 7/3/2023: Stage II (cT2, cN0, cM0) - Signed by Flavia Babb M.D. on 7/3/2023  Histopathologic type: Papillary transitional cell carcinoma  Stage prefix: Initial diagnosis  WHO/ISUP grade (low/high): High Grade  Histologic grading system: 2 grade system      TREATMENT SUMMARY:  Course First Treatment Date 2023  Course Last Treatment Date 2023  Aria Treatment Information          2023   Aria Course Treatment Dates   Course First Treatment Date 2023    Course Last Treatment Date 2023    Aria Treatment Summary   Bladder  Plan from Course C1_Whole bladder   Fraction 8 of 20   Elapsed Course Days 9 @ [id number]   Prescribed Fraction Dose 275 cGy   Prescribed Total Dose 5,500 cGy   PTV  Reference Point from Course C1_Whole bladder   Elapsed Course Days 9 @ [id number]   Session Dose 275 cGy   Total Dose 2,200 cGy          SUBJECTIVE:  Doing relatively okay, no specific urinary complaints today, occasional vague abdominal pain that resolved spontaneously, energy is good.    VITAL SIGNS:      2023     9:29 AM 2023     9:55 AM 7/3/2023     9:09 AM 2022     2:00 PM 2022    12:50 PM 2022    11:38 AM 2022    11:36 AM   Vitals   SYSTOLIC 137 125 110 132  160    DIASTOLIC 93 75 72 79  88    Pulse 73 74 54 61  63    Temperature 36.1 °C (96.9 °F)  36.2 °C (97.2 °F) 36.6 °C (97.9 °F)  36.7 °C (98 °F)    Respiration  18 16 17  18    Weight 158.29 156 157.41    145   Height   1.753 m (5' 9\")       BMI 23.38 kg/m2 23.04 kg/m2 23.25 kg/m2       Pulse Oximetry " 93 % 93 % 90 % 94 % 96 % 92 %      KPS: 90, Able to carry on normal activity; minor signs or symptoms of disease (ECOG equivalent 0)  Encounter Vitals  Temperature: 36.1 °C (96.9 °F)  Blood Pressure: (!) 137/93  Pulse: 73  Pulse Oximetry: 93 %  Weight: 71.8 kg (158 lb 4.6 oz)  Pain Score: 5=Moderate Pain      7/26/2023     9:29 AM 7/19/2023     9:55 AM 7/3/2023     9:09 AM   Pain Assessment   Pain Score 5=MODERATE P NO PAIN NO PAIN          PHYSICAL EXAM:  Physical Exam  Constitutional:       Appearance: Normal appearance.   Cardiovascular:      Rate and Rhythm: Normal rate and regular rhythm.   Abdominal:      General: Abdomen is flat. There is no distension.      Palpations: Abdomen is soft.      Tenderness: There is no abdominal tenderness.   Musculoskeletal:         General: Normal range of motion.   Neurological:      General: No focal deficit present.      Mental Status: He is alert and oriented to person, place, and time.              7/26/2023     9:32 AM 7/19/2023     9:56 AM   Toxicity Assessment   Toxicity Assessment Male Pelvis Male Pelvis   Fatigue (lethargy, malaise, asthenia) Moderate (e.g., decrease in performance status by 1 ECOG level or 20% Karnofsky) or causing difficulty performing some activities Moderate (e.g., decrease in performance status by 1 ECOG level or 20% Karnofsky) or causing difficulty performing some activities   Radiation Dermatitis None None   Anorexia Oral intake significantly decreased Oral intake significantly decreased   Colitis None None   Constipation None None   Dehydration Dry mucous membranes and/or diminished skin turgor Dry mucous membranes and/or diminished skin turgor   Diarrhea w/o Colostomy None None   Flatulence None Mild   Nausea Able to eat Oral intake significantly decreased   Proctitis None None   Vomiting None 1 episode in 24 hours over pretreatment   RT - Pain due to RT None None   Tumor Pain (onset or exacerbation of tumor pain due to treatment) None None    Dysuria (painful urination)  None   Urinary Frequency Increase in frequency up to 2x normal Increase in frequency up to 2x normal   Urinary Urgency Present Present   Bladder Spasms Absent Absent   Incontinence None None   Urinary Retention Normal Normal       CURRENT MEDICATIONS:    Current Outpatient Medications:     oxybutynin (DITROPAN) 5 MG Tab, Take 5 mg by mouth 3 times a day., Disp: , Rfl:     tiotropium (SPIRIVA) 18 MCG Cap, Place 18 mcg into inhaler and inhale every day., Disp: , Rfl:     albuterol 108 (90 Base) MCG/ACT Aero Soln inhalation aerosol, Inhale 2 Puffs every 6 hours as needed for Shortness of Breath., Disp: , Rfl:     LABORATORY DATA:   No results found for: SODIUM, POTASSIUM, CHLORIDE, CO2, GLUCOSE, BUN, CREATININE, BUNCREATRAT, GLOMRATE    No results found for: WBC, RBC, HEMOGLOBIN, HEMATOCRIT, MCV, MCH, MCHC, PLATELETCT      RADIOLOGY DATA:  No results found.    IMPRESSION:  Cancer Staging   Malignant neoplasm of overlapping sites of bladder (HCC)  Staging form: Urinary Bladder, AJCC 8th Edition  - Clinical stage from 7/3/2023: Stage II (cT2, cN0, cM0) - Signed by Flavia Babb M.D. on 7/3/2023      PLAN:  No change in treatment plan    Disposition:  Treatment plan and imaging reviewed. Questions answered. Continue therapy outlined.     Flavia Babb M.D.    No orders of the defined types were placed in this encounter.

## 2023-07-27 ENCOUNTER — HOSPITAL ENCOUNTER (OUTPATIENT)
Dept: RADIATION ONCOLOGY | Facility: MEDICAL CENTER | Age: 63
End: 2023-07-27
Payer: COMMERCIAL

## 2023-07-27 LAB
CHEMOTHERAPY INFUSION START DATE: NORMAL
CHEMOTHERAPY RECORDS: 2.75
CHEMOTHERAPY RECORDS: 5500
CHEMOTHERAPY RECORDS: NORMAL
CHEMOTHERAPY RX CANCER: NORMAL
DATE 1ST CHEMO CANCER: NORMAL
RAD ONC ARIA COURSE LAST TREATMENT DATE: NORMAL
RAD ONC ARIA COURSE TREATMENT ELAPSED DAYS: NORMAL
RAD ONC ARIA REFERENCE POINT DOSAGE GIVEN TO DATE: 24.75
RAD ONC ARIA REFERENCE POINT ID: NORMAL
RAD ONC ARIA REFERENCE POINT SESSION DOSAGE GIVEN: 2.75

## 2023-07-27 PROCEDURE — 77014 PR CT GUIDANCE PLACEMENT RAD THERAPY FIELDS: CPT | Mod: 26 | Performed by: RADIOLOGY

## 2023-07-27 PROCEDURE — 77386 HCHG IMRT DELIVERY COMPLEX: CPT | Performed by: RADIOLOGY

## 2023-07-28 ENCOUNTER — HOSPITAL ENCOUNTER (OUTPATIENT)
Dept: RADIATION ONCOLOGY | Facility: MEDICAL CENTER | Age: 63
End: 2023-07-28
Payer: COMMERCIAL

## 2023-07-28 LAB
CHEMOTHERAPY INFUSION START DATE: NORMAL
CHEMOTHERAPY RECORDS: 2.75
CHEMOTHERAPY RECORDS: 5500
CHEMOTHERAPY RECORDS: NORMAL
CHEMOTHERAPY RX CANCER: NORMAL
DATE 1ST CHEMO CANCER: NORMAL
RAD ONC ARIA COURSE LAST TREATMENT DATE: NORMAL
RAD ONC ARIA COURSE TREATMENT ELAPSED DAYS: NORMAL
RAD ONC ARIA REFERENCE POINT DOSAGE GIVEN TO DATE: 27.5
RAD ONC ARIA REFERENCE POINT ID: NORMAL
RAD ONC ARIA REFERENCE POINT SESSION DOSAGE GIVEN: 2.75

## 2023-07-28 PROCEDURE — 77014 PR CT GUIDANCE PLACEMENT RAD THERAPY FIELDS: CPT | Mod: 26 | Performed by: RADIOLOGY

## 2023-07-28 PROCEDURE — 77386 HCHG IMRT DELIVERY COMPLEX: CPT | Performed by: RADIOLOGY

## 2023-07-28 PROCEDURE — 77427 RADIATION TX MANAGEMENT X5: CPT | Performed by: RADIOLOGY

## 2023-07-31 ENCOUNTER — HOSPITAL ENCOUNTER (OUTPATIENT)
Dept: RADIATION ONCOLOGY | Facility: MEDICAL CENTER | Age: 63
End: 2023-07-31
Payer: COMMERCIAL

## 2023-07-31 LAB
CHEMOTHERAPY INFUSION START DATE: NORMAL
CHEMOTHERAPY RECORDS: 2.75
CHEMOTHERAPY RECORDS: 5500
CHEMOTHERAPY RECORDS: NORMAL
CHEMOTHERAPY RX CANCER: NORMAL
DATE 1ST CHEMO CANCER: NORMAL
RAD ONC ARIA COURSE LAST TREATMENT DATE: NORMAL
RAD ONC ARIA COURSE TREATMENT ELAPSED DAYS: NORMAL
RAD ONC ARIA REFERENCE POINT DOSAGE GIVEN TO DATE: 30.25
RAD ONC ARIA REFERENCE POINT ID: NORMAL
RAD ONC ARIA REFERENCE POINT SESSION DOSAGE GIVEN: 2.75

## 2023-07-31 PROCEDURE — 77014 PR CT GUIDANCE PLACEMENT RAD THERAPY FIELDS: CPT | Mod: 26 | Performed by: RADIOLOGY

## 2023-07-31 PROCEDURE — 77386 HCHG IMRT DELIVERY COMPLEX: CPT | Performed by: RADIOLOGY

## 2023-08-01 ENCOUNTER — HOSPITAL ENCOUNTER (OUTPATIENT)
Dept: RADIATION ONCOLOGY | Facility: MEDICAL CENTER | Age: 63
End: 2023-08-31
Attending: RADIOLOGY
Payer: COMMERCIAL

## 2023-08-01 ENCOUNTER — HOSPITAL ENCOUNTER (OUTPATIENT)
Dept: RADIATION ONCOLOGY | Facility: MEDICAL CENTER | Age: 63
End: 2023-08-01

## 2023-08-01 LAB
CHEMOTHERAPY INFUSION START DATE: NORMAL
CHEMOTHERAPY RECORDS: 2.75
CHEMOTHERAPY RECORDS: 5500
CHEMOTHERAPY RECORDS: NORMAL
CHEMOTHERAPY RX CANCER: NORMAL
DATE 1ST CHEMO CANCER: NORMAL
RAD ONC ARIA COURSE LAST TREATMENT DATE: NORMAL
RAD ONC ARIA COURSE TREATMENT ELAPSED DAYS: NORMAL
RAD ONC ARIA REFERENCE POINT DOSAGE GIVEN TO DATE: 33
RAD ONC ARIA REFERENCE POINT ID: NORMAL
RAD ONC ARIA REFERENCE POINT SESSION DOSAGE GIVEN: 2.75

## 2023-08-01 PROCEDURE — 77014 PR CT GUIDANCE PLACEMENT RAD THERAPY FIELDS: CPT | Mod: 26 | Performed by: RADIOLOGY

## 2023-08-01 PROCEDURE — 77386 HCHG IMRT DELIVERY COMPLEX: CPT | Performed by: RADIOLOGY

## 2023-08-02 ENCOUNTER — HOSPITAL ENCOUNTER (OUTPATIENT)
Dept: RADIATION ONCOLOGY | Facility: MEDICAL CENTER | Age: 63
End: 2023-08-02
Payer: COMMERCIAL

## 2023-08-02 VITALS
BODY MASS INDEX: 23.63 KG/M2 | HEART RATE: 72 BPM | SYSTOLIC BLOOD PRESSURE: 141 MMHG | TEMPERATURE: 98.4 F | OXYGEN SATURATION: 93 % | WEIGHT: 160 LBS | DIASTOLIC BLOOD PRESSURE: 68 MMHG

## 2023-08-02 LAB
CHEMOTHERAPY INFUSION START DATE: NORMAL
CHEMOTHERAPY RECORDS: 2.75
CHEMOTHERAPY RECORDS: 5500
CHEMOTHERAPY RECORDS: NORMAL
CHEMOTHERAPY RX CANCER: NORMAL
DATE 1ST CHEMO CANCER: NORMAL
RAD ONC ARIA COURSE LAST TREATMENT DATE: NORMAL
RAD ONC ARIA COURSE TREATMENT ELAPSED DAYS: NORMAL
RAD ONC ARIA REFERENCE POINT DOSAGE GIVEN TO DATE: 35.75
RAD ONC ARIA REFERENCE POINT ID: NORMAL
RAD ONC ARIA REFERENCE POINT SESSION DOSAGE GIVEN: 2.75

## 2023-08-02 PROCEDURE — 77014 PR CT GUIDANCE PLACEMENT RAD THERAPY FIELDS: CPT | Mod: 26 | Performed by: RADIOLOGY

## 2023-08-02 PROCEDURE — 77386 HCHG IMRT DELIVERY COMPLEX: CPT | Performed by: RADIOLOGY

## 2023-08-02 PROCEDURE — 77336 RADIATION PHYSICS CONSULT: CPT | Performed by: RADIOLOGY

## 2023-08-02 ASSESSMENT — PAIN SCALES - GENERAL: PAINLEVEL: 8=MODERATE-SEVERE PAIN

## 2023-08-02 NOTE — ON TREATMENT VISIT
ON TREATMENT  NOTE  RADIATION ONCOLOGY DEPARTMENT    Patient name:  Scotty Livingston    Primary Physician:  CHASE Mason MRN: [id number]  CSN: [id number]   Referring physician:  Aundrea Alvarez P.*   : 1960, 62 y.o.     ENCOUNTER DATE:  2023      DIAGNOSIS:  Malignant neoplasm of overlapping sites of bladder (HCC)  Staging form: Urinary Bladder, AJCC 8th Edition  - Clinical stage from 7/3/2023: Stage II (cT2, cN0, cM0) - Signed by Flavia Babb M.D. on 7/3/2023  Histopathologic type: Papillary transitional cell carcinoma  Stage prefix: Initial diagnosis  WHO/ISUP grade (low/high): High Grade  Histologic grading system: 2 grade system      TREATMENT SUMMARY:  Course First Treatment Date 2023  Course Last Treatment Date 2023  Aria Treatment Information          2023   Aria Course Treatment Dates   Course First Treatment Date 2023    Course Last Treatment Date 2023    Aria Treatment Summary   Bladder  Plan from Course C1_Whole bladder   Fraction  of 20   Elapsed Course Days 16 @ [id number]   Prescribed Fraction Dose 275 cGy   Prescribed Total Dose 5,500 cGy   PTV  Reference Point from Course C1_Whole bladder   Elapsed Course Days 16 @ [id number]   Session Dose 275 cGy   Total Dose 3,575 cGy          SUBJECTIVE:  He is having significant difficulty this week.  He reports his urinary flow is altered, decreased from prior, though no burning or blood clots or increased frequency.  However flow is just markedly reduced, but more intermittent now.  In addition, he is having hard bowel movements with surrounding mucus.  No pain or blood there either.  Eating okay, drinking okay.    VITAL SIGNS:      2023    10:28 AM 2023     9:29 AM 2023     9:55 AM 7/3/2023     9:09 AM 2022     2:00 PM 2022    12:50 PM 2022    11:38 AM   Vitals   SYSTOLIC 141 137 125 110 132  160   DIASTOLIC 68 93 75 72 79  88   Pulse 72 73 74 54 61  63  "  Temperature 36.9 °C (98.4 °F) 36.1 °C (96.9 °F)  36.2 °C (97.2 °F) 36.6 °C (97.9 °F)  36.7 °C (98 °F)   Respiration   18 16 17  18   Weight 160 158.29 156 157.41      Height    1.753 m (5' 9\")      BMI 23.63 kg/m2 23.38 kg/m2 23.04 kg/m2 23.25 kg/m2      Pulse Oximetry 93 % 93 % 93 % 90 % 94 % 96 % 92 %     KPS: 90, Able to carry on normal activity; minor signs or symptoms of disease (ECOG equivalent 0)  Encounter Vitals  Temperature: 36.9 °C (98.4 °F)  Temp src: Temporal  Blood Pressure: (!) 141/68  Pulse: 72  Pulse Oximetry: 93 %  Weight: 72.6 kg (160 lb)  Pain Score: 8=Moderate-Severe Pain      8/2/2023    10:28 AM 7/26/2023     9:29 AM 7/19/2023     9:55 AM 7/3/2023     9:09 AM   Pain Assessment   Pain Score 8=MODERATE-S 5=MODERATE P NO PAIN NO PAIN          PHYSICAL EXAM:  Physical Exam  Constitutional:       Appearance: Normal appearance.   Abdominal:      General: Abdomen is flat. There is no distension.      Palpations: Abdomen is soft. There is no mass.      Tenderness: There is no abdominal tenderness.   Neurological:      Mental Status: He is alert.              8/2/2023    10:25 AM 7/26/2023     9:32 AM 7/19/2023     9:56 AM   Toxicity Assessment   Toxicity Assessment Male Pelvis Male Pelvis Male Pelvis   Fatigue (lethargy, malaise, asthenia) None Moderate (e.g., decrease in performance status by 1 ECOG level or 20% Karnofsky) or causing difficulty performing some activities Moderate (e.g., decrease in performance status by 1 ECOG level or 20% Karnofsky) or causing difficulty performing some activities   Radiation Dermatitis None None None   Anorexia Oral intake significantly decreased Oral intake significantly decreased Oral intake significantly decreased   Colitis None None None   Constipation None None None   Dehydration None Dry mucous membranes and/or diminished skin turgor Dry mucous membranes and/or diminished skin turgor   Diarrhea w/o Colostomy None None None   Flatulence None None Mild "   Nausea None Able to eat Oral intake significantly decreased   Proctitis None None None   Vomiting None None 1 episode in 24 hours over pretreatment   RT - Pain due to RT None None None   Tumor Pain (onset or exacerbation of tumor pain due to treatment) None None None   Dysuria (painful urination) None  None   Urinary Frequency Increase >2x normal but <hourly Increase in frequency up to 2x normal Increase in frequency up to 2x normal   Urinary Urgency None Present Present   Bladder Spasms Absent Absent Absent   Incontinence None None None   Urinary Retention Normal Normal Normal       CURRENT MEDICATIONS:    Current Outpatient Medications:     oxybutynin (DITROPAN) 5 MG Tab, Take 5 mg by mouth 3 times a day., Disp: , Rfl:     tiotropium (SPIRIVA) 18 MCG Cap, Place 18 mcg into inhaler and inhale every day., Disp: , Rfl:     albuterol 108 (90 Base) MCG/ACT Aero Soln inhalation aerosol, Inhale 2 Puffs every 6 hours as needed for Shortness of Breath., Disp: , Rfl:     LABORATORY DATA:   No results found for: SODIUM, POTASSIUM, CHLORIDE, CO2, GLUCOSE, BUN, CREATININE, BUNCREATRAT, GLOMRATE    No results found for: WBC, RBC, HEMOGLOBIN, HEMATOCRIT, MCV, MCH, MCHC, PLATELETCT      RADIOLOGY DATA:  No results found.    IMPRESSION:  Cancer Staging   Malignant neoplasm of overlapping sites of bladder (HCC)  Staging form: Urinary Bladder, AJCC 8th Edition  - Clinical stage from 7/3/2023: Stage II (cT2, cN0, cM0) - Signed by Flavia Babb M.D. on 7/3/2023      PLAN:  No change in treatment plan.  Start laxative with Ex-Lax once daily, as well as resume Flomax nightly.  We will plan to reassess next week.    Disposition:  Treatment plan and imaging reviewed. Questions answered. Continue therapy outlined.     Flavia Babb M.D.    No orders of the defined types were placed in this encounter.

## 2023-08-03 ENCOUNTER — HOSPITAL ENCOUNTER (OUTPATIENT)
Dept: RADIATION ONCOLOGY | Facility: MEDICAL CENTER | Age: 63
End: 2023-08-03
Payer: COMMERCIAL

## 2023-08-03 ENCOUNTER — PATIENT OUTREACH (OUTPATIENT)
Dept: ONCOLOGY | Facility: MEDICAL CENTER | Age: 63
End: 2023-08-03
Payer: COMMERCIAL

## 2023-08-03 LAB
CHEMOTHERAPY INFUSION START DATE: NORMAL
CHEMOTHERAPY RECORDS: 2.75
CHEMOTHERAPY RECORDS: 5500
CHEMOTHERAPY RECORDS: NORMAL
CHEMOTHERAPY RX CANCER: NORMAL
DATE 1ST CHEMO CANCER: NORMAL
RAD ONC ARIA COURSE LAST TREATMENT DATE: NORMAL
RAD ONC ARIA COURSE TREATMENT ELAPSED DAYS: NORMAL
RAD ONC ARIA REFERENCE POINT DOSAGE GIVEN TO DATE: 38.5
RAD ONC ARIA REFERENCE POINT ID: NORMAL
RAD ONC ARIA REFERENCE POINT SESSION DOSAGE GIVEN: 2.75

## 2023-08-03 PROCEDURE — 77386 HCHG IMRT DELIVERY COMPLEX: CPT | Performed by: RADIOLOGY

## 2023-08-03 PROCEDURE — 77014 PR CT GUIDANCE PLACEMENT RAD THERAPY FIELDS: CPT | Mod: 26 | Performed by: RADIOLOGY

## 2023-08-03 NOTE — PROGRESS NOTES
Call placed to pt to follow up and check in. Pt is doing okay. He states he struggles with a lot of fatigue, been going on for two years. But recently he is lacking sleep, not able to sleep and rest adequately. Pt states he is talking to his PCP about this issue. No other needs identified on this call. Pt will have radiation treatment tomorrow 8/4 at 10 am.

## 2023-08-04 ENCOUNTER — HOSPITAL ENCOUNTER (OUTPATIENT)
Dept: RADIATION ONCOLOGY | Facility: MEDICAL CENTER | Age: 63
End: 2023-08-04
Payer: COMMERCIAL

## 2023-08-04 ENCOUNTER — PATIENT OUTREACH (OUTPATIENT)
Dept: ONCOLOGY | Facility: MEDICAL CENTER | Age: 63
End: 2023-08-04
Payer: COMMERCIAL

## 2023-08-04 LAB
CHEMOTHERAPY INFUSION START DATE: NORMAL
CHEMOTHERAPY RECORDS: 2.75
CHEMOTHERAPY RECORDS: 5500
CHEMOTHERAPY RECORDS: NORMAL
CHEMOTHERAPY RX CANCER: NORMAL
DATE 1ST CHEMO CANCER: NORMAL
RAD ONC ARIA COURSE LAST TREATMENT DATE: NORMAL
RAD ONC ARIA COURSE TREATMENT ELAPSED DAYS: NORMAL
RAD ONC ARIA REFERENCE POINT DOSAGE GIVEN TO DATE: 41.25
RAD ONC ARIA REFERENCE POINT ID: NORMAL
RAD ONC ARIA REFERENCE POINT SESSION DOSAGE GIVEN: 2.75

## 2023-08-04 PROCEDURE — 77014 PR CT GUIDANCE PLACEMENT RAD THERAPY FIELDS: CPT | Mod: 26 | Performed by: RADIOLOGY

## 2023-08-04 PROCEDURE — 77427 RADIATION TX MANAGEMENT X5: CPT | Performed by: RADIOLOGY

## 2023-08-04 PROCEDURE — 77386 HCHG IMRT DELIVERY COMPLEX: CPT | Performed by: RADIOLOGY

## 2023-08-04 NOTE — PROGRESS NOTES
Met pt in radiation lobby while he was waiting for treatment. Pt is hanging in there. Reports experiencing lots of s/e of chemo after Tuesdays infusion. There is evidence of a hematoma on his arm from the PIV, resolving. States there is still a lot of pain in the arm. Pt has been using heat and cold to soothe as he sees fit.  Spoke with Hollie, dietician, to get some Pedialyte samples for pt per his request. She was able to supply them. Will continue to follow.

## 2023-08-07 ENCOUNTER — HOSPITAL ENCOUNTER (OUTPATIENT)
Dept: RADIATION ONCOLOGY | Facility: MEDICAL CENTER | Age: 63
End: 2023-08-07
Payer: COMMERCIAL

## 2023-08-07 LAB
CHEMOTHERAPY INFUSION START DATE: NORMAL
CHEMOTHERAPY RECORDS: 2.75
CHEMOTHERAPY RECORDS: 5500
CHEMOTHERAPY RECORDS: NORMAL
CHEMOTHERAPY RX CANCER: NORMAL
DATE 1ST CHEMO CANCER: NORMAL
RAD ONC ARIA COURSE LAST TREATMENT DATE: NORMAL
RAD ONC ARIA COURSE TREATMENT ELAPSED DAYS: NORMAL
RAD ONC ARIA REFERENCE POINT DOSAGE GIVEN TO DATE: 44
RAD ONC ARIA REFERENCE POINT ID: NORMAL
RAD ONC ARIA REFERENCE POINT SESSION DOSAGE GIVEN: 2.75

## 2023-08-07 PROCEDURE — 77386 HCHG IMRT DELIVERY COMPLEX: CPT | Performed by: RADIOLOGY

## 2023-08-07 PROCEDURE — 77014 PR CT GUIDANCE PLACEMENT RAD THERAPY FIELDS: CPT | Mod: 26 | Performed by: RADIOLOGY

## 2023-08-08 ENCOUNTER — HOSPITAL ENCOUNTER (OUTPATIENT)
Dept: RADIATION ONCOLOGY | Facility: MEDICAL CENTER | Age: 63
End: 2023-08-08
Payer: COMMERCIAL

## 2023-08-08 LAB
CHEMOTHERAPY INFUSION START DATE: NORMAL
CHEMOTHERAPY RECORDS: 2.75
CHEMOTHERAPY RECORDS: 5500
CHEMOTHERAPY RECORDS: NORMAL
CHEMOTHERAPY RX CANCER: NORMAL
DATE 1ST CHEMO CANCER: NORMAL
RAD ONC ARIA COURSE LAST TREATMENT DATE: NORMAL
RAD ONC ARIA COURSE TREATMENT ELAPSED DAYS: NORMAL
RAD ONC ARIA REFERENCE POINT DOSAGE GIVEN TO DATE: 46.75
RAD ONC ARIA REFERENCE POINT ID: NORMAL
RAD ONC ARIA REFERENCE POINT SESSION DOSAGE GIVEN: 2.75

## 2023-08-08 PROCEDURE — 77014 PR CT GUIDANCE PLACEMENT RAD THERAPY FIELDS: CPT | Mod: 26 | Performed by: RADIOLOGY

## 2023-08-08 PROCEDURE — 77386 HCHG IMRT DELIVERY COMPLEX: CPT | Performed by: RADIOLOGY

## 2023-08-09 ENCOUNTER — HOSPITAL ENCOUNTER (OUTPATIENT)
Dept: RADIATION ONCOLOGY | Facility: MEDICAL CENTER | Age: 63
End: 2023-08-09
Payer: COMMERCIAL

## 2023-08-09 ENCOUNTER — PATIENT OUTREACH (OUTPATIENT)
Dept: ONCOLOGY | Facility: MEDICAL CENTER | Age: 63
End: 2023-08-09
Payer: COMMERCIAL

## 2023-08-09 VITALS
DIASTOLIC BLOOD PRESSURE: 80 MMHG | RESPIRATION RATE: 24 BRPM | HEART RATE: 72 BPM | BODY MASS INDEX: 23.64 KG/M2 | SYSTOLIC BLOOD PRESSURE: 136 MMHG | OXYGEN SATURATION: 94 % | WEIGHT: 160.05 LBS

## 2023-08-09 LAB
CHEMOTHERAPY INFUSION START DATE: NORMAL
CHEMOTHERAPY RECORDS: 2.75
CHEMOTHERAPY RECORDS: 5500
CHEMOTHERAPY RECORDS: NORMAL
CHEMOTHERAPY RX CANCER: NORMAL
DATE 1ST CHEMO CANCER: NORMAL
RAD ONC ARIA COURSE LAST TREATMENT DATE: NORMAL
RAD ONC ARIA COURSE TREATMENT ELAPSED DAYS: NORMAL
RAD ONC ARIA REFERENCE POINT DOSAGE GIVEN TO DATE: 49.5
RAD ONC ARIA REFERENCE POINT ID: NORMAL
RAD ONC ARIA REFERENCE POINT SESSION DOSAGE GIVEN: 2.75

## 2023-08-09 PROCEDURE — 77386 HCHG IMRT DELIVERY COMPLEX: CPT | Performed by: RADIOLOGY

## 2023-08-09 PROCEDURE — 77014 PR CT GUIDANCE PLACEMENT RAD THERAPY FIELDS: CPT | Mod: 26 | Performed by: RADIOLOGY

## 2023-08-09 PROCEDURE — 77336 RADIATION PHYSICS CONSULT: CPT | Performed by: RADIOLOGY

## 2023-08-09 ASSESSMENT — PAIN SCALES - GENERAL: PAINLEVEL: NO PAIN

## 2023-08-09 NOTE — ON TREATMENT VISIT
ON TREATMENT  NOTE  RADIATION ONCOLOGY DEPARTMENT    Patient name:  Scotty Livingston    Primary Physician:  CHASE Mason MRN: [id number]  CSN: [id number]   Referring physician:  Aundrea Alvarez P.*   : 1960, 62 y.o.     ENCOUNTER DATE:  2023      DIAGNOSIS:  Malignant neoplasm of overlapping sites of bladder (HCC)  Staging form: Urinary Bladder, AJCC 8th Edition  - Clinical stage from 7/3/2023: Stage II (cT2, cN0, cM0) - Signed by Flavia Babb M.D. on 7/3/2023  Histopathologic type: Papillary transitional cell carcinoma  Stage prefix: Initial diagnosis  WHO/ISUP grade (low/high): High Grade  Histologic grading system: 2 grade system      TREATMENT SUMMARY:  Course First Treatment Date 2023  Course Last Treatment Date 2023  Aria Treatment Information          2023   Aria Course Treatment Dates   Course First Treatment Date 2023    Course Last Treatment Date 2023    Aria Treatment Summary   Bladder  Plan from Course C1_Whole bladder   Fraction    Elapsed Course Days  @ [id number]   Prescribed Fraction Dose 275 cGy   Prescribed Total Dose 5,500 cGy   PTV  Reference Point from Course C1_Whole bladder   Elapsed Course Days  @ [id number]   Session Dose 275 cGy   Total Dose 4,950 cGy          SUBJECTIVE:  He seems to be not feeling well today.  Complains of nausea, occasional dizziness, though no formal pelvic symptoms.  Voiding well, bowels markedly improved since starting laxative last week.  Vitals today relatively at baseline, though subjectively not feeling well.    VITAL SIGNS:      2023    10:34 AM 2023    10:28 AM 2023     9:29 AM 2023     9:55 AM 7/3/2023     9:09 AM 2022     2:00 PM 2022    12:50 PM   Vitals   SYSTOLIC 136 141 137 125 110 132    DIASTOLIC 80 68 93 75 72 79    Pulse 72 72 73 74 54 61    Temperature  36.9 °C (98.4 °F) 36.1 °C (96.9 °F)  36.2 °C (97.2 °F) 36.6 °C (97.9 °F)    Respiration 24    "18 16 17    Weight 160.05 160 158.29 156 157.41     Height     1.753 m (5' 9\")     BMI 23.64 kg/m2 23.63 kg/m2 23.38 kg/m2 23.04 kg/m2 23.25 kg/m2     Pulse Oximetry 94 % 93 % 93 % 93 % 90 % 94 % 96 %     KPS: 80, Normal activity with effort; some signs or symptoms of disease (ECOG equivalent 1)  Encounter Vitals  Temperature:  (refused, nausea)  Blood Pressure: 136/80  Pulse: 72  Respiration: (!) 24  Pulse Oximetry: 94 %  Weight: 72.6 kg (160 lb 0.9 oz)  Pain Score: No pain      8/9/2023    10:34 AM 8/2/2023    10:28 AM 7/26/2023     9:29 AM 7/19/2023     9:55 AM 7/3/2023     9:09 AM   Pain Assessment   Pain Score NO PAIN 8=MODERATE-S 5=MODERATE P NO PAIN NO PAIN          PHYSICAL EXAM:  Physical Exam  Constitutional:       General: He is in acute distress.      Appearance: He is ill-appearing.   Cardiovascular:      Rate and Rhythm: Normal rate and regular rhythm.   Pulmonary:      Effort: Pulmonary effort is normal.      Breath sounds: Normal breath sounds.   Abdominal:      General: Abdomen is flat. There is no distension.      Palpations: Abdomen is soft.      Tenderness: There is no abdominal tenderness.   Neurological:      General: No focal deficit present.      Mental Status: He is alert and oriented to person, place, and time.              8/9/2023    10:35 AM 8/2/2023    10:25 AM 7/26/2023     9:32 AM 7/19/2023     9:56 AM   Toxicity Assessment   Toxicity Assessment Male Pelvis Male Pelvis Male Pelvis Male Pelvis   Fatigue (lethargy, malaise, asthenia) Moderate (e.g., decrease in performance status by 1 ECOG level or 20% Karnofsky) or causing difficulty performing some activities None Moderate (e.g., decrease in performance status by 1 ECOG level or 20% Karnofsky) or causing difficulty performing some activities Moderate (e.g., decrease in performance status by 1 ECOG level or 20% Karnofsky) or causing difficulty performing some activities   Radiation Dermatitis None None None None   Anorexia Oral intake " significantly decreased Oral intake significantly decreased Oral intake significantly decreased Oral intake significantly decreased   Colitis None None None None   Constipation Requiring laxatives None None None   Dehydration Dry mucous membranes and/or diminished skin turgor None Dry mucous membranes and/or diminished skin turgor Dry mucous membranes and/or diminished skin turgor   Diarrhea w/o Colostomy None None None None   Flatulence None None None Mild   Nausea Oral intake significantly decreased None Able to eat Oral intake significantly decreased   Proctitis None None None None   Vomiting 2-5 episodes in 24 hours over pretreatment None None 1 episode in 24 hours over pretreatment   RT - Pain due to RT None None None None   Tumor Pain (onset or exacerbation of tumor pain due to treatment) None None None None   Dysuria (painful urination) None None  None   Urinary Frequency Normal Increase >2x normal but <hourly Increase in frequency up to 2x normal Increase in frequency up to 2x normal   Urinary Urgency None None Present Present   Bladder Spasms Mild symptoms, not requiring intervention Absent Absent Absent   Incontinence None None None None   Urinary Retention Normal Normal Normal Normal       CURRENT MEDICATIONS:    Current Outpatient Medications:     oxybutynin (DITROPAN) 5 MG Tab, Take 5 mg by mouth 3 times a day., Disp: , Rfl:     tiotropium (SPIRIVA) 18 MCG Cap, Place 18 mcg into inhaler and inhale every day., Disp: , Rfl:     albuterol 108 (90 Base) MCG/ACT Aero Soln inhalation aerosol, Inhale 2 Puffs every 6 hours as needed for Shortness of Breath., Disp: , Rfl:     LABORATORY DATA:   No results found for: SODIUM, POTASSIUM, CHLORIDE, CO2, GLUCOSE, BUN, CREATININE, BUNCREATRAT, GLOMRATE    No results found for: WBC, RBC, HEMOGLOBIN, HEMATOCRIT, MCV, MCH, MCHC, PLATELETCT      RADIOLOGY DATA:  No results found.    IMPRESSION:  Cancer Staging   Malignant neoplasm of overlapping sites of bladder  (HCC)  Staging form: Urinary Bladder, AJCC 8th Edition  - Clinical stage from 7/3/2023: Stage II (cT2, cN0, cM0) - Signed by Flavia Babb M.D. on 7/3/2023      PLAN:  No change in treatment plan.  Advised to please let us obtain full set of vitals, he declined.  Clinically seems to be mildly ill-appearing, offered visit to the emergency room, which she also refused.  He wants to go home, and reassess tomorrow.    Disposition:  Treatment plan and imaging reviewed. Questions answered. Continue therapy outlined.     Flavia Babb M.D.    No orders of the defined types were placed in this encounter.

## 2023-08-09 NOTE — PROGRESS NOTES
"ONN went to find pt in XRT after treatment. Pt was very lightheaded, needed to sit down. ONN sat with pt for many minutes. Pt is discouraged daily and wants to \"bail\". ONN encouraged pt to finish treatments, two more days left. Pt is worried about [facility]. Pt was still reporting dizzyness sitting down. Then reports he \"might get sick.\" Emesis bag obtained. Pt reports eating and drinking okay. Radiation nurse notified. To visit with Dr. Babb today.  "

## 2023-08-10 ENCOUNTER — HOSPITAL ENCOUNTER (OUTPATIENT)
Dept: RADIATION ONCOLOGY | Facility: MEDICAL CENTER | Age: 63
End: 2023-08-10
Payer: COMMERCIAL

## 2023-08-10 LAB
CHEMOTHERAPY INFUSION START DATE: NORMAL
CHEMOTHERAPY RECORDS: 2.75
CHEMOTHERAPY RECORDS: 5500
CHEMOTHERAPY RECORDS: NORMAL
CHEMOTHERAPY RX CANCER: NORMAL
DATE 1ST CHEMO CANCER: NORMAL
RAD ONC ARIA COURSE LAST TREATMENT DATE: NORMAL
RAD ONC ARIA COURSE TREATMENT ELAPSED DAYS: NORMAL
RAD ONC ARIA REFERENCE POINT DOSAGE GIVEN TO DATE: 52.25
RAD ONC ARIA REFERENCE POINT ID: NORMAL
RAD ONC ARIA REFERENCE POINT SESSION DOSAGE GIVEN: 2.75

## 2023-08-10 PROCEDURE — 77386 HCHG IMRT DELIVERY COMPLEX: CPT | Performed by: RADIOLOGY

## 2023-08-10 PROCEDURE — 77014 PR CT GUIDANCE PLACEMENT RAD THERAPY FIELDS: CPT | Mod: 26 | Performed by: RADIOLOGY

## 2023-08-11 ENCOUNTER — PATIENT OUTREACH (OUTPATIENT)
Dept: ONCOLOGY | Facility: MEDICAL CENTER | Age: 63
End: 2023-08-11
Payer: COMMERCIAL

## 2023-08-11 ENCOUNTER — HOSPITAL ENCOUNTER (OUTPATIENT)
Dept: RADIATION ONCOLOGY | Facility: MEDICAL CENTER | Age: 63
End: 2023-08-11
Payer: COMMERCIAL

## 2023-08-11 LAB
CHEMOTHERAPY INFUSION START DATE: NORMAL
CHEMOTHERAPY INFUSION START DATE: NORMAL
CHEMOTHERAPY INFUSION STOP DATE: NORMAL
CHEMOTHERAPY RECORDS: 2.75
CHEMOTHERAPY RECORDS: 2.75
CHEMOTHERAPY RECORDS: 5500
CHEMOTHERAPY RECORDS: 5500
CHEMOTHERAPY RECORDS: NORMAL
CHEMOTHERAPY RX CANCER: NORMAL
CHEMOTHERAPY RX CANCER: NORMAL
DATE 1ST CHEMO CANCER: NORMAL
DATE 1ST CHEMO CANCER: NORMAL
RAD ONC ARIA COURSE LAST TREATMENT DATE: NORMAL
RAD ONC ARIA COURSE LAST TREATMENT DATE: NORMAL
RAD ONC ARIA COURSE TREATMENT ELAPSED DAYS: NORMAL
RAD ONC ARIA COURSE TREATMENT ELAPSED DAYS: NORMAL
RAD ONC ARIA REFERENCE POINT DOSAGE GIVEN TO DATE: 55
RAD ONC ARIA REFERENCE POINT DOSAGE GIVEN TO DATE: 55
RAD ONC ARIA REFERENCE POINT ID: NORMAL
RAD ONC ARIA REFERENCE POINT ID: NORMAL
RAD ONC ARIA REFERENCE POINT SESSION DOSAGE GIVEN: 2.75

## 2023-08-11 PROCEDURE — 77427 RADIATION TX MANAGEMENT X5: CPT | Performed by: RADIOLOGY

## 2023-08-11 PROCEDURE — 77386 HCHG IMRT DELIVERY COMPLEX: CPT | Performed by: RADIOLOGY

## 2023-08-11 PROCEDURE — 77014 PR CT GUIDANCE PLACEMENT RAD THERAPY FIELDS: CPT | Mod: 26 | Performed by: RADIOLOGY

## 2023-08-11 NOTE — PROGRESS NOTES
Met with pt in radiation lobby. This is his final XRT today. Also he will have his last chemo at the [facility] after at 11:00. Pt is in good spirits. Will continue to follow.

## 2023-08-14 NOTE — RADIATION COMPLETION NOTES
END OF TREATMENT SUMMARY    Patient name:  Scotty Livingston    Primary Physician:  CHASE Mason MRN: [id number]  CSN: [id number]   Referring physician:  Aundrea Alvarez P : 1960, 62 y.o.       TREATMENT SUMMARY:        Course First Treatment Date 2023    Course Last Treatment Date 2023   Course Elapsed Days 25 @ [id number]   Course Intent Curative     Radiation Therapy Episodes       Active Episodes       Radiation Therapy: IMRT (2023)                   Radiation Treatments         Plan Last Treated On Elapsed Days Fractions Treated Prescribed Fraction Dose (cGy) Prescribed Total Dose (cGy)    Bladder 2023 25 @ [id number] 20 of 20 275 5,500                  Reference Point Last Treated On Elapsed Days Most Recent Session Dose (cGy) Total Dose (cGy)    PTV 2023 25 @ [id number] -- 5,500                                     STAGE:   Malignant neoplasm of overlapping sites of bladder (HCC)  Staging form: Urinary Bladder, AJCC 8th Edition  - Clinical stage from 7/3/2023: Stage II (cT2, cN0, cM0) - Signed by Flavia Babb M.D. on 7/3/2023  Histopathologic type: Papillary transitional cell carcinoma  Stage prefix: Initial diagnosis  WHO/ISUP grade (low/high): High Grade  Histologic grading system: 2 grade system       TREATMENT INDICATION:   Definitive chemoradiation for bladder cancer     CONCURRENT SYSTEMIC TREATMENT:   Gemcitabine     RT COURSE DISCONTINUED EARLY:   No     PATIENT EXPERIENCE:       2023    10:35 AM 2023    10:25 AM 2023     9:32 AM 2023     9:56 AM   Toxicity Assessment   Toxicity Assessment Male Pelvis Male Pelvis Male Pelvis Male Pelvis   Fatigue (lethargy, malaise, asthenia) Moderate (e.g., decrease in performance status by 1 ECOG level or 20% Karnofsky) or causing difficulty performing some activities None Moderate (e.g., decrease in performance status by 1 ECOG level or 20% Karnofsky) or causing difficulty performing  some activities Moderate (e.g., decrease in performance status by 1 ECOG level or 20% Karnofsky) or causing difficulty performing some activities   Radiation Dermatitis None None None None   Anorexia Oral intake significantly decreased Oral intake significantly decreased Oral intake significantly decreased Oral intake significantly decreased   Colitis None None None None   Constipation Requiring laxatives None None None   Dehydration Dry mucous membranes and/or diminished skin turgor None Dry mucous membranes and/or diminished skin turgor Dry mucous membranes and/or diminished skin turgor   Diarrhea w/o Colostomy None None None None   Flatulence None None None Mild   Nausea Oral intake significantly decreased None Able to eat Oral intake significantly decreased   Proctitis None None None None   Vomiting 2-5 episodes in 24 hours over pretreatment None None 1 episode in 24 hours over pretreatment   RT - Pain due to RT None None None None   Tumor Pain (onset or exacerbation of tumor pain due to treatment) None None None None   Dysuria (painful urination) None None  None   Urinary Frequency Normal Increase >2x normal but <hourly Increase in frequency up to 2x normal Increase in frequency up to 2x normal   Urinary Urgency None None Present Present   Bladder Spasms Mild symptoms, not requiring intervention Absent Absent Absent   Incontinence None None None None   Urinary Retention Normal Normal Normal Normal        FOLLOW-UP PLAN:   6 Weeks     COMMENT:          ANATOMIC TARGET SUMMARY    ANATOMIC TARGET MODALITY TECHNIQUE   Whole bladder   External beam, photons IMRT            COMMENT:         DIAGRAMS:      DOSE VOLUME HISTOGRAMS:

## 2023-08-17 ENCOUNTER — PATIENT OUTREACH (OUTPATIENT)
Dept: ONCOLOGY | Facility: MEDICAL CENTER | Age: 63
End: 2023-08-17
Payer: COMMERCIAL

## 2023-08-31 ENCOUNTER — PATIENT OUTREACH (OUTPATIENT)
Dept: ONCOLOGY | Facility: MEDICAL CENTER | Age: 63
End: 2023-08-31
Payer: COMMERCIAL

## 2023-08-31 NOTE — PROGRESS NOTES
Call placed to pt to check in on him. No answer to call. Did not leave VM at this time. Will continue to follow.

## 2023-09-25 ENCOUNTER — HOSPITAL ENCOUNTER (OUTPATIENT)
Dept: RADIATION ONCOLOGY | Facility: MEDICAL CENTER | Age: 63
End: 2023-09-30
Attending: RADIOLOGY
Payer: COMMERCIAL

## 2023-09-25 NOTE — PROGRESS NOTES
This visit is being conducted by telephone. This telephone visit was initiated by the patient and they verbally consented. Patient at home in [facility].      Reason for Call:  Post tx FU    Treatment History:     Radiation Oncology          8/10/2023 8/11/2023   Aria Course Treatment Dates   Course First Treatment Date 07/17/2023 07/17/2023    Course Last Treatment Date 08/10/2023   08/11/2023    Aria Treatment Summary   Bladder  Plan from Course C1_Whole bladder   Fraction 19 of 20 20 of 20    20 of 20   Elapsed Course Days 24 @ [id number] 25 @ [id number]    25 @ [id number]   Prescribed Fraction Dose 275 cGy 275 cGy    275 cGy   Prescribed Total Dose 5,500 cGy 5,500 cGy    5,500 cGy   PTV  Reference Point from Course C1_Whole bladder   Elapsed Course Days 24 @ [id number] 25 @ [id number]    25 @ [id number]   Session Dose 275 cGy 275 cGy    --   Total Dose 5,225 cGy 5,500 cGy    5,500 cGy      Details          More values are hidden. Newest values shown. Go to activity for more data.                HPI:    Subjective    This is a 62-year-old gentleman who comes to discuss bladder preservation therapy for his recently diagnosed muscle invasive bladder cancer.  In brief, he reports that he has had an overactive bladder with frequent urination about every 1 hour over the last 2 years.  In addition, he developed intermittent hematuria about 6 months ago.       This led to a consult to urology in May of this year in the setting of a CT scan that revealed a 4.9 cm mass in the left wall of the bladder, no obvious adenopathy, concerning for neoplasm.  He subsequently had a cystoscopy and a TURBT performed that revealed a 5 cm mass in the left bladder wall, lateral to the left UO, abutting the bladder neck.  Final pathology from the TURBT specimen spanning over 5 cm was invasive urothelial carcinoma, invading the muscularis propria.  No grading is present.     Of note, he was also prescribed oxybutynin for  his overactive bladder, and his urinary frequency has improved tremendously.  As a result of his pathology, he was then referred to medical oncology and saw Aundrea Alvarez.  He says they discussed surgery versus bladder preservation, and a strongly preserve his bladder preservation.  That is why he is referred to us today.  A CT of the chest is pending for completion of work-up.  With regards to medical oncology, he does apparently have good urinary and renal function, no obvious hydronephrosis, no other obvious medical contraindications, and the likely the plan would be for low-dose gemcitabine versus a mitomycin based treatment along with concurrent radiation.     He now comes to review bladder preservation further today.  Currently feeling well, no fevers or chills, occasionally has what he describes as foul-smelling urine, though this was in the setting of his recent cystoscopy, and has not recurred since then.  No further hematuria.  Good urinary function.  Good erectile function.        Interval History:   9/25/2023 he is doing relatively okay.  He still quite significantly fatigued, still having some daytime frequency and urgency type issues, but feels like he is slowly recovering.  No bleeding.  Able to void otherwise relatively comfortably.  No dysuria.  Still has a slight warm feeling in his pelvic skin, but that is slowly improving as well.  [facility] is unsure if he has any [facility] scheduled.  Also he has done a great job quitting smoking, but has about 2 to 3 cigarettes/day that has been persistent and is asking about potentially getting Chantix today.  [facility], but has been suggested for Wellbutrin instead and does not want to do Wellbutrin instead but wants Chantix..    Labs / Images Reviewed:   No results found.    Assessment:   Malignant neoplasm of overlapping sites of bladder (HCC)  Staging form: Urinary Bladder, AJCC 8th Edition  - Clinical  stage from 7/3/2023: Stage II (cT2, cN0, cM0) - Signed by Flavia Babb M.D. on 7/3/2023  Histopathologic type: Papillary transitional cell carcinoma  Stage prefix: Initial diagnosis  WHO/ISUP grade (low/high): High Grade  Histologic grading system: 2 grade system      Cancer Status:   Therapy Completed, Status Pending Restaging Work-up    Plan:   [facility]. I will check with Aundrea Alvarez and if so he can have those and we will see him thereafter. If not, then we can certainly arrange scans here. Likewise, with regards to the chantix, will again see if [facility], otherwise will prescribe here.  Plan to continue oxybutynin for now, until restaging scans and then we can assess tapering this off.    Time Spent on Call: 5 minutes      Time Spent in Preparation: 5 minutes    Total Time Spent: 10 minutes    Flavia Babb M.D.

## 2023-10-12 ENCOUNTER — PATIENT OUTREACH (OUTPATIENT)
Dept: ONCOLOGY | Facility: MEDICAL CENTER | Age: 63
End: 2023-10-12
Payer: COMMERCIAL

## 2023-11-16 ENCOUNTER — APPOINTMENT (OUTPATIENT)
Dept: RADIATION ONCOLOGY | Facility: MEDICAL CENTER | Age: 63
End: 2023-11-16
Attending: RADIOLOGY
Payer: COMMERCIAL

## 2023-12-07 ENCOUNTER — PATIENT OUTREACH (OUTPATIENT)
Dept: ONCOLOGY | Facility: MEDICAL CENTER | Age: 63
End: 2023-12-07
Payer: COMMERCIAL

## 2024-01-29 ENCOUNTER — PATIENT OUTREACH (OUTPATIENT)
Dept: ONCOLOGY | Facility: MEDICAL CENTER | Age: 64
End: 2024-01-29
Payer: COMMERCIAL

## 2024-01-29 NOTE — PROGRESS NOTES
"Scotty arrived to speak to ONN after calling this AM to visit in person. He had a \"sensitive\" topic to discuss. ONN and Scotty met privately in conference room, where he gathered the strength to ask \"Have I been neutered??\" He states maybe he didn't hear Dr. Babb fully when discussing the s/e of bladder radiation, or about chemotherapy. He has given it \"a lot of time\" however he is having ED now which he states he never had before. \"I never ever doubted it, I had full confidence.\" But now, after treatment, Scotty has been struggling when masturbating to get or maintain an erection. On the JOSE sexual health inventory filled out today he was a 10 = moderate ED range. Patient hasn't really attempted intercourse because of what has been happening to him solo. He desires to have a \"girlfriend\" again and wants to know what is going on and what expectations to have going forward. Scotty has an appt with urology on Friday 2/2, where Dr. Feldman will perform a cystoscopy. This also makes Scotty nervous as \"last time\" he had bleeding for a day and pain associated. ONN tried to assess if any component of the ED was related to body dysmorphia or fear of rejection. ONN actively listened to Scotty's story and will speak to Dr. Babb on his behalf. Then ONN will search for community resources related to post-Radiation/Chemotherapy changes in men's sexual health. ONN will seek out a sexual therapist to refer Scotty to if appropriate.   On a side note, since completion of treatment last year, Scotty has quit smoking cigarettes and marijuana, and stopped all alcohol consumption. He wants to eliminate anything that could be a contributing factor but also expresses giving up all pleasurable things seems like giving up on living. He does not want to give up instead he really wants to live fully! ONN updated patient contact with new cell phone number.  "

## 2024-02-01 NOTE — PROGRESS NOTES
After speaking about Scotty's concerns with Dr. Babb, TONI called Scotty back. Dr. Babb feels that yes, this ED is related to the bladder directed radiation treatments, which are closely located to the prostate in men. Scotty is interested in having a visit with Dr. Babb to discuss this more and see if medication options would be a good fit for him. TONI will reach out to  to contact Scotty.   TONI has researched some local sex therapists and have included the numbers below. Will provide patient with these resources when he visits in person.     Katelyn Sherman @ [employer] [phone] (around $250 a visit; [place] only)  Joe Grant @ [email or web address] [phone] (around $150-200 a visit)

## 2024-02-09 ENCOUNTER — HOSPITAL ENCOUNTER (OUTPATIENT)
Dept: RADIATION ONCOLOGY | Facility: MEDICAL CENTER | Age: 64
End: 2024-02-29
Attending: RADIOLOGY
Payer: COMMERCIAL

## 2024-02-09 VITALS
SYSTOLIC BLOOD PRESSURE: 126 MMHG | BODY MASS INDEX: 23.54 KG/M2 | OXYGEN SATURATION: 92 % | WEIGHT: 159.39 LBS | RESPIRATION RATE: 18 BRPM | HEART RATE: 75 BPM | DIASTOLIC BLOOD PRESSURE: 81 MMHG

## 2024-02-09 DIAGNOSIS — C67.8 MALIGNANT NEOPLASM OF OVERLAPPING SITES OF BLADDER (HCC): ICD-10-CM

## 2024-02-09 DIAGNOSIS — N52.35 ERECTILE DYSFUNCTION FOLLOWING RADIATION THERAPY: ICD-10-CM

## 2024-02-09 PROCEDURE — 99214 OFFICE O/P EST MOD 30 MIN: CPT | Performed by: RADIOLOGY

## 2024-02-09 PROCEDURE — 99212 OFFICE O/P EST SF 10 MIN: CPT | Performed by: RADIOLOGY

## 2024-02-09 RX ORDER — TADALAFIL 5 MG/1
5 TABLET ORAL DAILY
Qty: 90 TABLET | Refills: 2 | Status: SHIPPED | OUTPATIENT
Start: 2024-02-09 | End: 2024-11-05

## 2024-02-09 RX ORDER — VARENICLINE TARTRATE 1 MG/1
1 TABLET, FILM COATED ORAL 2 TIMES DAILY
COMMUNITY

## 2024-02-09 ASSESSMENT — PAIN SCALES - GENERAL: PAINLEVEL: NO PAIN

## 2024-02-09 NOTE — PROGRESS NOTES
RADIATION ONCOLOGY FOLLOW-UP    Patient name:  Scotty Livingston    Primary Physician:  No primary care provider on file. MRN: [id number]  CSN: [id number]   Referring physician:  Aundrea Alvarez P.*  : 1960, 63 y.o.     DATE OF SERVICE: 2024    IDENTIFICATION:   A 63 y.o. male with    Malignant neoplasm of overlapping sites of bladder (HCC)  Staging form: Urinary Bladder, AJCC 8th Edition  - Clinical stage from 7/3/2023: Stage II (cT2, cN0, cM0) - Signed by Flavia Babb M.D. on 7/3/2023  Histopathologic type: Papillary transitional cell carcinoma  Stage prefix: Initial diagnosis  WHO/ISUP grade (low/high): High Grade  Histologic grading system: 2 grade system      RADIATION SUMMARY:  Radiation Oncology          8/10/2023 2023   Aria Course Treatment Dates   Course First Treatment Date 2023    Course Last Treatment Date 08/10/2023   2023    Aria Treatment Summary   Bladder  Plan from Course C1_Whole bladder   Fraction 19 of 20 20 of 20    20 of 20   Elapsed Course Days 24 @ [id number] 25 @ [id number]    25 @ [id number]   Prescribed Fraction Dose 275 cGy 275 cGy    275 cGy   Prescribed Total Dose 5,500 cGy 5,500 cGy    5,500 cGy   PTV  Reference Point from Course C1_Whole bladder   Elapsed Course Days  @ [id number] 25 @ [id number]    25 @ [id number]   Session Dose 275 cGy 275 cGy    --   Total Dose 5,225 cGy 5,500 cGy    5,500 cGy      Details          More values are hidden. Newest values shown. Go to activity for more data.                HISTORY OF PRESENT ILLNESS:  Subjective    This is a 62-year-old gentleman who comes to discuss bladder preservation therapy for his recently diagnosed muscle invasive bladder cancer.  In brief, he reports that he has had an overactive bladder with frequent urination about every 1 hour over the last 2 years.  In addition, he developed intermittent hematuria about 6 months ago.       This led to a consult to urology in  May of this year in the setting of a CT scan that revealed a 4.9 cm mass in the left wall of the bladder, no obvious adenopathy, concerning for neoplasm.  He subsequently had a cystoscopy and a TURBT performed that revealed a 5 cm mass in the left bladder wall, lateral to the left UO, abutting the bladder neck.  Final pathology from the TURBT specimen spanning over 5 cm was invasive urothelial carcinoma, invading the muscularis propria.  No grading is present.     Of note, he was also prescribed oxybutynin for his overactive bladder, and his urinary frequency has improved tremendously.  As a result of his pathology, he was then referred to medical oncology and saw Aundrea Alvarez.  He says they discussed surgery versus bladder preservation, and a strongly preserve his bladder preservation.  That is why he is referred to us today.  A CT of the chest is pending for completion of work-up.  With regards to medical oncology, he does apparently have good urinary and renal function, no obvious hydronephrosis, no other obvious medical contraindications, and the likely the plan would be for low-dose gemcitabine versus a mitomycin based treatment along with concurrent radiation.     He now comes to review bladder preservation further today.  Currently feeling well, no fevers or chills, occasionally has what he describes as foul-smelling urine, though this was in the setting of his recent cystoscopy, and has not recurred since then.  No further hematuria.  Good urinary function.  Good erectile function.     9/25/2023 he is doing relatively okay. He still quite significantly fatigued, still having some daytime frequency and urgency type issues, but feels like he is slowly recovering. No bleeding. Able to void otherwise relatively comfortably. No dysuria. Still has a slight warm feeling in his pelvic skin, but that is slowly improving as well. [facility] is unsure if he has any scans scheduled. Also he  has done a great job quitting smoking, but has about 2 to 3 cigarettes/day that has been persistent and is asking about potentially getting Chantix today. [facility], but has been suggested for Wellbutrin instead and does not want to do Wellbutrin instead but wants Chantix..     INTERVAL HISTORY:  2/9/24 With regards to cancer, he is actually doing quite okay.  He had imaging studies in October that showed an excellent response.  He actually had a recent cystoscopy also that was reportedly without any concerning findings, though I do not have these records to review.  He comes today due to complaints of erectile dysfunction.  He has a history of erectile dysfunction, most notably while he was on Wellbutrin for about 2 years shortly before starting radiation therapy.  He had stopped this and was slowly recovering his erections when he started radiation therapy with me.  He reports that since the completion of radiation, he has difficulty both with erections as well as with low volume ejaculate.  He is confused by this and wants to discuss this further today.      PROBLEM LIST:  Patient Active Problem List   Diagnosis    Malignant neoplasm of overlapping sites of bladder (HCC)       CURRENT MEDICATIONS:  Current Outpatient Medications   Medication Sig Dispense Refill    varenicline (CHANTIX) 1 MG tablet Take 1 mg by mouth 2 times a day.      tiotropium (SPIRIVA) 18 MCG Cap Place 18 mcg into inhaler and inhale every day.      albuterol 108 (90 Base) MCG/ACT Aero Soln inhalation aerosol Inhale 2 Puffs every 6 hours as needed for Shortness of Breath.      oxybutynin (DITROPAN) 5 MG Tab Take 5 mg by mouth 3 times a day. (Patient not taking: Reported on 2/9/2024)       No current facility-administered medications for this encounter.       ALLERGIES:  Dilaudid [hydromorphone], Duloxetine hcl, Gabapentin, and Morphine    REVIEW OF SYSTEMS:    A complete review of system taken. Pertinent items in HPI.   "All others negative.    PHYSICAL EXAM:  PERFORMANCE STATUS:       No data to display                   No data to display              /81   Pulse 75   Resp 18   Wt 72.3 kg (159 lb 6.3 oz)   SpO2 92%   BMI 23.54 kg/m²   Physical Exam  Constitutional:       Appearance: Normal appearance.   Pulmonary:      Effort: Pulmonary effort is normal. No respiratory distress.   Abdominal:      General: Abdomen is flat.      Palpations: Abdomen is soft.      Tenderness: There is no abdominal tenderness.   Musculoskeletal:         General: Normal range of motion.   Neurological:      Mental Status: He is alert.         LABORATORY DATA:   No results found for: \"WBC\", \"RBC\", \"HEMOGLOBIN\", \"HEMATOCRIT\", \"MCV\", \"MCH\", \"MCHC\", \"RDW\", \"PLATELETCT\", \"MPV\", \"NEUTSPOLYS\", \"LYMPHOCYTES\", \"MONOCYTES\", \"EOSINOPHILS\", \"BASOPHILS\", \"HYPOCHROMIA\", \"ANISOCYTOSIS\"   No results found for: \"SODIUM\", \"POTASSIUM\", \"CHLORIDE\", \"CO2\", \"GLUCOSE\", \"BUN\", \"CREATININE\", \"BUNCREATRAT\", \"GLOMRATE\"      RADIOLOGY DATA:  No results found.    IMPRESSION:    A 63 y.o. with   Malignant neoplasm of overlapping sites of bladder (HCC)  Staging form: Urinary Bladder, AJCC 8th Edition  - Clinical stage from 7/3/2023: Stage II (cT2, cN0, cM0) - Signed by Flavia Babb M.D. on 7/3/2023  Histopathologic type: Papillary transitional cell carcinoma  Stage prefix: Initial diagnosis  WHO/ISUP grade (low/high): High Grade  Histologic grading system: 2 grade system      CANCER STATUS:  Disease Stable    RECOMMENDATIONS:   I had a long discussion with Scotty again today about what to expect after completion of chemoradiation for bladder cancer.  I discussed with him that we can work this up further with a testosterone level just to ensure that he is appropriate in that regard, [facility].  I imagine this is likely to be relatively normal, and much of his erectile dysfunction actually stems from the chemoradiation.  We reviewed the " detailed images in our treatment planning system that showed the bulk of the radiation dose and the bladder as well as the base of the prostate and the bladder neck.  We discussed how this leads to erectile dysfunction particularly with the neurovascular bundles that are located here.  He may ultimately have some recovery over the course of several months, but he will always have some element of erectile dysfunction, particularly given that he had this before the start of radiation therapy.  I reviewed with him that there is some evidence that a daily Cialis may help in some recovery of his erectile function, and he is interested in this.  Cardiovascularly he is doing okay, so I will prescribe this for him and [facility] .  Will plan to follow-up in 3 months to see if this helps at all.  If this is not enough, then we can consider more aggressive measures, including Trimix as well as ultimately penile pumps if he is so interested, though certainly these will be with urology now with me.    With regards to his low volume ejaculate, this is unlikely to return.  He is not sterile, he is certainly still making spermatozoa, but his seminal vesicles have been radiated and he will likely have low volume ejaculate going forward.  I reviewed with him that this is going to be chronic, though certainly he has to be careful about contraception since technically he is not sterile.  He understands this now.    For now, proceed with Cialis.  Follow-up in 3 months.    Thank you for the opportunity to participate in his care.  If any questions or comments, please do not hesitate in calling.    Orders Placed This Encounter    varenicline (CHANTIX) 1 MG tablet

## 2024-02-09 NOTE — PROGRESS NOTES
Patient was seen today in clinic with Dr. Babb for follow up.  Vitals signs and weight were obtained and pain assessment was completed.  Allergies and medications were reviewed with the patient.       Vitals/Pain:  Vitals:    02/09/24 0907   BP: 126/81   Pulse: 75   Resp: 18   SpO2: 92%   Weight: 72.3 kg (159 lb 6.3 oz)   Pain Score: No pain        Allergies:   Dilaudid [hydromorphone], Duloxetine hcl, Gabapentin, and Morphine    Current Medications:  Current Outpatient Medications   Medication Sig Dispense Refill    varenicline (CHANTIX) 1 MG tablet Take 1 mg by mouth 2 times a day.      tiotropium (SPIRIVA) 18 MCG Cap Place 18 mcg into inhaler and inhale every day.      albuterol 108 (90 Base) MCG/ACT Aero Soln inhalation aerosol Inhale 2 Puffs every 6 hours as needed for Shortness of Breath.      oxybutynin (DITROPAN) 5 MG Tab Take 5 mg by mouth 3 times a day. (Patient not taking: Reported on 2/9/2024)       No current facility-administered medications for this encounter.         PCP:  No primary care provider on file.        Aundrea Salcedo R.N.

## 2024-10-22 ENCOUNTER — APPOINTMENT (RX ONLY)
Dept: URBAN - METROPOLITAN AREA CLINIC 4 | Facility: CLINIC | Age: 64
Setting detail: DERMATOLOGY
End: 2024-10-22

## 2024-10-22 DIAGNOSIS — L82.1 OTHER SEBORRHEIC KERATOSIS: ICD-10-CM

## 2024-10-22 PROCEDURE — 99202 OFFICE O/P NEW SF 15 MIN: CPT

## 2024-10-22 PROCEDURE — ? COUNSELING

## 2024-10-22 ASSESSMENT — LOCATION ZONE DERM: LOCATION ZONE: FACE

## 2024-10-22 ASSESSMENT — LOCATION DETAILED DESCRIPTION DERM: LOCATION DETAILED: RIGHT CENTRAL TEMPLE

## 2024-10-22 ASSESSMENT — LOCATION SIMPLE DESCRIPTION DERM: LOCATION SIMPLE: RIGHT TEMPLE
